# Patient Record
Sex: FEMALE | Race: WHITE | NOT HISPANIC OR LATINO | Employment: UNEMPLOYED | URBAN - METROPOLITAN AREA
[De-identification: names, ages, dates, MRNs, and addresses within clinical notes are randomized per-mention and may not be internally consistent; named-entity substitution may affect disease eponyms.]

---

## 2020-07-20 ENCOUNTER — OFFICE VISIT (OUTPATIENT)
Dept: NEUROSURGERY | Facility: CLINIC | Age: 55
End: 2020-07-20

## 2020-07-20 VITALS — BODY MASS INDEX: 38.05 KG/M2 | WEIGHT: 228.4 LBS | TEMPERATURE: 97.8 F | HEIGHT: 65 IN

## 2020-07-20 DIAGNOSIS — R55 SYNCOPE AND COLLAPSE: ICD-10-CM

## 2020-07-20 DIAGNOSIS — R51.9 BILATERAL HEADACHES: ICD-10-CM

## 2020-07-20 DIAGNOSIS — R00.1 BRADYCARDIA: ICD-10-CM

## 2020-07-20 DIAGNOSIS — R42 DIZZINESS OF UNKNOWN CAUSE: ICD-10-CM

## 2020-07-20 DIAGNOSIS — M50.30 DDD (DEGENERATIVE DISC DISEASE), CERVICAL: Primary | ICD-10-CM

## 2020-07-20 PROCEDURE — 99203 OFFICE O/P NEW LOW 30 MIN: CPT | Performed by: NEUROLOGICAL SURGERY

## 2020-07-20 RX ORDER — MELOXICAM 7.5 MG/1
7.5 TABLET ORAL DAILY
COMMUNITY

## 2020-07-20 RX ORDER — LISINOPRIL 10 MG/1
10 TABLET ORAL DAILY
COMMUNITY

## 2020-07-20 RX ORDER — ESOMEPRAZOLE MAGNESIUM 40 MG/1
40 CAPSULE, DELAYED RELEASE ORAL DAILY
COMMUNITY
Start: 2016-03-28

## 2020-07-20 RX ORDER — SERTRALINE HYDROCHLORIDE 25 MG/1
50 TABLET, FILM COATED ORAL DAILY
COMMUNITY

## 2020-07-20 NOTE — PROGRESS NOTES
Ciara Hector  1965  [id number]      Chief Complaint   Patient presents with   • Neck Pain   • Headache   • Dizziness   • Loss of Consciousness     2x       HISTORY OF PRESENT ILLNESS: This is a 55-year-old female who presents with 8-month history severe headaches associated with syncopal episodes, vertigo and memory loss.  Cervical MRI was performed and she is referred for neurosurgical consultation.  She has pain with range of motion of her neck and hears a crunching sound.  However she has no radiculopathy.  She has no symptoms of myelopathy.    Past Medical History:   Diagnosis Date   • Diabetes mellitus (CMS/HCC)     Type 2   • Sciatic pain        Past Surgical History:   Procedure Laterality Date   • ABDOMINAL HYSTERECTOMY     • APPENDECTOMY     • CHOLECYSTECTOMY     • DIAGNOSTIC LAPAROSCOPY      Lysis of Adhesions   • GASTRIC BYPASS     • LAPAROSCOPIC GASTRIC BANDING     • TUBAL ABDOMINAL LIGATION         Family History   Problem Relation Age of Onset   • Diabetes Mother    • Hypertension Mother    • Liver disease Mother    • No Known Problems Father        Social History     Socioeconomic History   • Marital status:      Spouse name: Not on file   • Number of children: Not on file   • Years of education: Not on file   • Highest education level: Not on file   Tobacco Use   • Smoking status: Current Every Day Smoker     Packs/day: 0.50     Years: 20.00     Pack years: 10.00     Types: Cigarettes   • Smokeless tobacco: Never Used   Substance and Sexual Activity   • Alcohol use: No   • Drug use: No   • Sexual activity: Defer       Allergies   Allergen Reactions   • Dilaudid [Hydromorphone Hcl]          Current Outpatient Medications:   •  aspirin 81 MG EC tablet, Take 81 mg by mouth Daily., Disp: , Rfl:   •  esomeprazole (nexIUM) 40 MG capsule, Take 40 mg by mouth Daily., Disp: , Rfl:   •  lisinopril (PRINIVIL,ZESTRIL) 10 MG tablet, Take 10 mg by mouth Daily., Disp: , Rfl:   •  meloxicam (MOBIC) 7.5  MG tablet, Take 7.5 mg by mouth Daily., Disp: , Rfl:   •  Multiple Vitamins-Minerals (MULTIVITAMIN ADULT PO), Take 1 tablet by mouth Daily., Disp: , Rfl:   •  sertraline (ZOLOFT) 25 MG tablet, Take 25 mg by mouth Daily., Disp: , Rfl:     Review of Systems   Constitutional: Positive for activity change, diaphoresis, fatigue and unexpected weight change. Negative for appetite change, chills and fever.   HENT: Positive for ear pain. Negative for congestion, dental problem, drooling, ear discharge, facial swelling, hearing loss, mouth sores, nosebleeds, postnasal drip, rhinorrhea, sinus pressure, sneezing, sore throat, tinnitus, trouble swallowing and voice change.    Eyes: Positive for photophobia. Negative for pain, discharge, redness, itching and visual disturbance.   Respiratory: Negative for apnea, cough, choking, chest tightness, shortness of breath, wheezing and stridor.    Cardiovascular: Negative for chest pain, palpitations and leg swelling.   Gastrointestinal: Positive for diarrhea and vomiting. Negative for abdominal distention, abdominal pain, anal bleeding, blood in stool, constipation, nausea and rectal pain.   Endocrine: Positive for heat intolerance and polyuria. Negative for cold intolerance, polydipsia and polyphagia.   Genitourinary: Positive for frequency. Negative for decreased urine volume, difficulty urinating, dysuria, enuresis, flank pain, genital sores, hematuria and urgency.   Musculoskeletal: Positive for arthralgias, back pain, joint swelling, neck pain and neck stiffness. Negative for gait problem and myalgias.   Skin: Positive for pallor. Negative for color change, rash and wound.   Allergic/Immunologic: Negative for environmental allergies, food allergies and immunocompromised state.   Neurological: Positive for dizziness, syncope, weakness, light-headedness and headaches. Negative for tremors, seizures, facial asymmetry, speech difficulty and numbness.   Hematological: Negative for  "adenopathy. Does not bruise/bleed easily.   Psychiatric/Behavioral: Positive for decreased concentration and sleep disturbance. Negative for agitation, behavioral problems, confusion, dysphoric mood, hallucinations, self-injury and suicidal ideas. The patient is not nervous/anxious and is not hyperactive.    All other systems reviewed and are negative.      Vitals:    07/20/20 1037   Temp: 97.8 °F (36.6 °C)   TempSrc: Infrared   Weight: 104 kg (228 lb 6.4 oz)   Height: 163.8 cm (64.5\")       Neurological Examination:    Mental status/speech: The patient is alert and oriented.  Speech is clear without aphysia or dysarthria.  No overt cognitive deficits.    Cranial nerve examination:    Olfaction: Smell is intact.  Vision: Vision is intact without visual field abnormalities.  Funduscopic examination is normal.  No pupillary irregularity.  Ocular motor examination: The extraocular muscles are intact.  There is no diplopia.  The pupil is round and reactive to both light and accommodation.  There is no nystagmus.  Facial movement/sensation: There is no facial weakness.  Sensation is intact in the first, second, and third divisions of the trigeminal nerve.  The corneal reflex is intact.  Auditory: Hearing is intact to finger rub bilaterally.  Cranial nerves IX, X, XI, XII: Phonation is normal.  No dysphagia.  Tongue is protruded in the midline without atrophy.  The gag reflex is intact.  Shoulder shrug is normal.    Musculoligamentous ligamentous examination: BMI 38.6.  Weight 228 pounds.  She has diminished range of motion of the cervical spine.  Strength is intact.  No weakness, sensory loss or reflex asymmetry.  Gait normal.  No ataxia.  No Babinski Cami or clonus.    Medical Decision Making:     Diagnostic Data Set: Cervical MRI shows mild disc degeneration C5-6 and C6-7.  No significant compromise of the spinal canal.      Assessment: Cervical spondylosis, syncope etiology undetermined          Recommendations: " She has mild cervical spondylosis which does not explain the symptoms of syncope.  I have recommended evaluation by cardiology and neurology.  I have made the appropriate referrals.  She does not have a condition that requires neurosurgical intervention.        I greatly appreciate the opportunity to see and evaluate this individual.  If you have questions or concerns regarding issues that I may have overlooked please call me at any time: [phone].  Mychal Caballero M.D.  [facility]  [address] [place] [address]    Scribed for Btuch Caballero MD by Maria Isabel Mathis CMA. 7/20/2020 11:23

## 2020-07-23 ENCOUNTER — OFFICE VISIT (OUTPATIENT)
Dept: NEUROLOGY | Facility: CLINIC | Age: 55
End: 2020-07-23

## 2020-07-23 VITALS
SYSTOLIC BLOOD PRESSURE: 108 MMHG | DIASTOLIC BLOOD PRESSURE: 70 MMHG | OXYGEN SATURATION: 97 % | WEIGHT: 229 LBS | HEIGHT: 64 IN | TEMPERATURE: 97.5 F | BODY MASS INDEX: 39.09 KG/M2 | HEART RATE: 66 BPM

## 2020-07-23 DIAGNOSIS — R55 SYNCOPE AND COLLAPSE: Primary | ICD-10-CM

## 2020-07-23 DIAGNOSIS — R42 DIZZINESS OF UNKNOWN CAUSE: ICD-10-CM

## 2020-07-23 PROCEDURE — 99205 OFFICE O/P NEW HI 60 MIN: CPT | Performed by: PSYCHIATRY & NEUROLOGY

## 2020-07-23 NOTE — PROGRESS NOTES
"Subjective:    CC: Ciara Hector is seen today in consultation at the request of Butch Caballero MD for Headache (NP)       HPI:  55-year-old female with a past medical history of hypertension, diabetes mellitus type 2 (well controlled off medications), arthritis presents with dizziness/syncopal episodes.  As per patient she started getting extremely dizzy in January this year.  She describes the dizziness as a feeling of the room spinning around her as well as a sense of constant imbalance while walking as if being \"drunk \".  The dizziness can be provoked by sudden head movement or by standing up but is present all the time now.  A few months ago she also started to have fatigue, weakness as well as frequent headaches that start off in the back of her head and radiate to the front like a band.  She states that her ears hurt too.  Had blood work that was unremarkable including a blood glucose level and imaging of the cervical spine which showed degenerative changes and was told by her PCP that her symptoms could be due to that however she saw Dr. Caabllero who did not think that she was a surgical candidate right now or that her symptoms were being caused by a pinched nerve.  Patient states that she has also had 2 syncopal episodes in the past 3 weeks.  During the first episode she was eating in a restaurant with a friend when all of a sudden she felt hot even though there was air conditioning inside followed by passing out for a few minutes.  When she woke up she was extremely sweaty.  Her second episode happened a week ago when she got up to get something and suddenly fell down losing consciousness.  As per her son she was cold and clammy and her lips turned blue.  He also could not find a pulse on her for a few seconds.  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  I reviewed her last cardiac work-up in 2015 including an echocardiogram and a carotid Doppler.  Carotid Doppler showed bilateral plaques with no significant stenosis and echocardiogram showed a normal EF with mild pulmonary hypertension but no PFO.  Patient also says that she was started on lisinopril in May of this year as few of her readings in clinic for high however her baseline blood pressure has always been low.    The following portions of the patient's history were reviewed today and updated as of 07/23/2020  : allergies, current medications, past family history, past medical history, past social history, past surgical history and problem list  These document will be scanned to patient's chart.      Current Outpatient Medications:   •  aspirin 81 MG EC tablet, Take 81 mg by mouth Daily., Disp: , Rfl:   •  esomeprazole (nexIUM) 40 MG capsule, Take 40 mg by mouth Daily., Disp: , Rfl:   •  lisinopril (PRINIVIL,ZESTRIL) 10 MG tablet, Take 10 mg by mouth Daily., Disp: , Rfl:   •  meloxicam (MOBIC) 7.5 MG tablet, Take 7.5 mg by mouth Daily., Disp: , Rfl:   •  Multiple Vitamins-Minerals (MULTIVITAMIN ADULT PO), Take 1 tablet by mouth Daily., Disp: , Rfl:   •  sertraline (ZOLOFT) 25 MG tablet, Take 25 mg by mouth Daily., Disp: , Rfl:    Past Medical History:   Diagnosis Date   • Diabetes mellitus (CMS/HCC)     Type 2   • Sciatic pain       Past Surgical History:   Procedure Laterality Date   • ABDOMINAL HYSTERECTOMY     • APPENDECTOMY     • CHOLECYSTECTOMY     • DIAGNOSTIC LAPAROSCOPY      Lysis of Adhesions   • GASTRIC BYPASS     • LAPAROSCOPIC GASTRIC BANDING     • TUBAL ABDOMINAL LIGATION        Family History   Problem Relation Age of Onset   • Diabetes Mother    • Hypertension Mother    • Liver disease Mother    • No Known Problems Father       Social History     Socioeconomic History   • Marital status:      Spouse name: Not on file   • Number of children: Not on file   • Years of  "education: Not on file   • Highest education level: Not on file   Tobacco Use   • Smoking status: Current Every Day Smoker     Packs/day: 0.50     Years: 20.00     Pack years: 10.00     Types: Cigarettes   • Smokeless tobacco: Never Used   Substance and Sexual Activity   • Alcohol use: No   • Drug use: No   • Sexual activity: Defer     Review of Systems   Constitutional: Positive for fatigue.   HENT: Positive for tinnitus.    Neurological: Positive for dizziness, syncope, weakness, light-headedness (feels like she is drunk at times, staggering etc.) and headache (Head hurts down the sides, including ear area).   All other systems reviewed and are negative.      Objective:    /70   Pulse 66   Temp 97.5 °F (36.4 °C)   Ht 163.8 cm (64.49\")   Wt 104 kg (229 lb)   SpO2 97%   BMI 38.72 kg/m²     Neurology Exam:    General apperance: NAD.  Orthostatics checked today minimally positive and showed a 18 point drop point drop in systolic blood pressure on standing up.  Blood pressure on laying down went up to 180/78 with a pulse of 58 and blood pressure on standing up was 162/79 with a heart rate of 59.  Modified Elvia-Hallpike test made her extremely dizzy however no nystagmus was noted    Mental status: Alert, awake and oriented to time place and person.    Recent and Remote memory: Intact.    Attention span and Concentration: Normal.     Language and Speech: Intact- No dysarthria.    Fluency, Naming , Repitition and Comprehension:  Intact    Cranial Nerves:   CN II: Visual fields are full. Intact. Fundi - Normal, No papillederma, Pupils - YOEL  CN III, IV and VI: Extraocular movements are intact. Normal saccades.  Mild end gaze nystagmus to the right  CN V: Facial sensation is intact.   CN VII: Muscles of facial expression reveal no asymmetry. Intact.   CN VIII: Hearing is intact. Whispered voice intact.   CN IX and X: Palate elevates symmetrically. Intact  CN XI: Shoulder shrug is intact.   CN XII: Tongue is " midline without evidence of atrophy or fasciculation.     Ophthalmoscopic exam of optic disc-normal    Motor:  Right UE muscle strength 5/5. Normal tone.     Left UE muscle strength 5/5. Normal tone.      Right LE muscle strength5/5. Normal tone.     Left LE muscle strength 5/5. Normal tone.      Sensory: Normal light touch, vibration and pinprick sensation bilaterally.    DTRs: 2+ bilaterally in upper and lower extremities.    Babinski: Negative bilaterally.    Co-ordination: Normal finger-to-nose, heel to shin B/L.    Rhomberg: Positive    Gait: Mildly ataxic while walking    Cardiovascular: Regular rate and rhythm without murmur, gallop or rub.    Assessment and Plan:  1. Syncope and collapse  I will get a Holter monitor.  Patient is also seeing a cardiologist in a few days  For orthostatic hypotension I have told her to follow the below measures.    - Holter Monitor - 72 Hour Up To 21 Days; Future  - MRI Brain Without Contrast; Future  - CT Angiogram Head; Future  - CT Angiogram Neck; Future    2. Dizziness of unknown cause  Based on her constant dizziness I would like to rule out a posterior circulation stroke.  Orthostatics were also minimally positive today  And so we will get a MRI brain in addition to a CT angiogram of the head and neck to rule out vertebral basilar/carotid insufficiency  I have told her to keep herself extremely well-hydrated and wear above-knee moderate compression stockings for increased venous return  She should continue aspirin 81 mg daily for now  I have told her to monitor her blood pressure every day    - MRI Brain Without Contrast; Future  - CT Angiogram Head; Future  - CT Angiogram Neck; Future       Return in about 6 weeks (around 9/3/2020).     I spent over 60 minutes with the patient face to face out of which over 50% (30 minutes) was spent in management, instructions and education regarding her episodes.     Lis Israel MD

## 2020-07-29 ENCOUNTER — HOSPITAL ENCOUNTER (OUTPATIENT)
Dept: CT IMAGING | Facility: HOSPITAL | Age: 55
Discharge: HOME | End: 2020-07-29

## 2020-07-29 ENCOUNTER — HOSPITAL ENCOUNTER (OUTPATIENT)
Dept: MRI IMAGING | Facility: HOSPITAL | Age: 55
Discharge: HOME | End: 2020-07-29
Admitting: PSYCHIATRY & NEUROLOGY

## 2020-07-29 ENCOUNTER — CONSULT (OUTPATIENT)
Dept: CARDIOLOGY | Facility: CLINIC | Age: 55
End: 2020-07-29

## 2020-07-29 VITALS
HEART RATE: 56 BPM | HEIGHT: 64 IN | WEIGHT: 226 LBS | OXYGEN SATURATION: 96 % | DIASTOLIC BLOOD PRESSURE: 72 MMHG | SYSTOLIC BLOOD PRESSURE: 120 MMHG | BODY MASS INDEX: 38.58 KG/M2

## 2020-07-29 DIAGNOSIS — I10 ESSENTIAL HYPERTENSION: ICD-10-CM

## 2020-07-29 DIAGNOSIS — R42 DIZZINESS OF UNKNOWN CAUSE: ICD-10-CM

## 2020-07-29 DIAGNOSIS — R00.1 BRADYCARDIA: ICD-10-CM

## 2020-07-29 DIAGNOSIS — R55 SYNCOPE AND COLLAPSE: ICD-10-CM

## 2020-07-29 DIAGNOSIS — R55 SYNCOPE AND COLLAPSE: Primary | ICD-10-CM

## 2020-07-29 DIAGNOSIS — I20.8 ANGINAL EQUIVALENT (HCC): ICD-10-CM

## 2020-07-29 PROBLEM — G45.9 TIA (TRANSIENT ISCHEMIC ATTACK): Status: ACTIVE | Noted: 2020-07-29

## 2020-07-29 PROBLEM — I77.9 BILATERAL CAROTID ARTERY DISEASE (HCC): Status: ACTIVE | Noted: 2020-07-29

## 2020-07-29 PROBLEM — Z86.73 HISTORY OF TRANSIENT ISCHEMIC ATTACK (TIA): Status: ACTIVE | Noted: 2020-07-29

## 2020-07-29 PROBLEM — E11.9 DM (DIABETES MELLITUS), TYPE 2 (HCC): Status: ACTIVE | Noted: 2020-07-29

## 2020-07-29 PROBLEM — Z72.0 TOBACCO ABUSE: Status: ACTIVE | Noted: 2020-07-29

## 2020-07-29 PROBLEM — K21.9 GERD (GASTROESOPHAGEAL REFLUX DISEASE): Status: ACTIVE | Noted: 2020-07-29

## 2020-07-29 LAB — CREAT BLDA-MCNC: 0.8 MG/DL (ref 0.6–1.3)

## 2020-07-29 PROCEDURE — 70496 CT ANGIOGRAPHY HEAD: CPT

## 2020-07-29 PROCEDURE — 99204 OFFICE O/P NEW MOD 45 MIN: CPT | Performed by: INTERNAL MEDICINE

## 2020-07-29 PROCEDURE — 70551 MRI BRAIN STEM W/O DYE: CPT

## 2020-07-29 PROCEDURE — 70498 CT ANGIOGRAPHY NECK: CPT

## 2020-07-29 PROCEDURE — 0 IOPAMIDOL PER 1 ML: Performed by: PSYCHIATRY & NEUROLOGY

## 2020-07-29 PROCEDURE — 82565 ASSAY OF CREATININE: CPT

## 2020-07-29 RX ADMIN — IOPAMIDOL 75 ML: 755 INJECTION, SOLUTION INTRAVENOUS at 15:53

## 2020-07-29 NOTE — PROGRESS NOTES
"      Subjective   Ciara Hector is a 55 y.o. female.  Primary Care: Yenny Aldrich APRN  Referring: Butch Caballero MD  [address] [facility]  [address]  [place] [address]      Chief Complaint   Patient presents with   • Dizziness   • Irregular Heart Beat   • Leg Pain   • Nausea   • Vomiting     not often        Patient Active Problem List    Diagnosis    1 Dizziness of unknown cause    2 Bradycardia    3 Syncope and collapse      · Echo 3-27-15: Normal LV, EF 60%.  Mild pulmonary hypertension, RVSP 35 mmHg.  Negative bubble study   4 Essential hypertension    5 Bilateral carotid artery disease (CMS/HCC)      Duplex 3-26-15: mild plague bilaterally   6 Tobacco abuse    7 DM (diabetes mellitus), type 2 (CMS/HCC)    8 Abnormal EKG    9 GERD (gastroesophageal reflux disease)    10 History of transient ischemic attack (TIA)    11 DDD (degenerative disc disease), cervical    12 Bilateral headaches       History of Present Illness   This is a 55-year-old hypertensive diabetic female smoker with no significant prior cardiac history.  She underwent cardiac evaluation for presurgical clearance in 2016 at which time she was felt to have an abnormal EKG.  She had had a normal echocardiogram the previous year and was cleared for surgery.  In 2015 she had a TIA which consisted of 2 brief episodes of right-sided facial drooping and numbness.  She had a carotid duplex study at that time which showed mild nonobstructive carotid plaques bilaterally.  She now presents with a 7-month complaint of progressive dizziness nausea and headache.  She states her ears are \"constantly roaring\".  She has had 3 episodes of sayda syncope in the past 3 weeks.  States she gets no warning and wakes on the floor unaware of the events.  There is no prodrome of dizziness, lightheadedness or tachypalpitations.  The second syncopal episode was witnessed by family who states there was no tonic-clonic activity and that she was out for " approximately 1 minute.  She awakens oriented to person place and time.  She had loss of bladder control on her third syncopal episode.  She states she always wakes up feeling hot.  Her blood pressure has been running greater than 160 mmHg systolic.  She has been on her current antihypertensive since early this spring.  She is a diet-controlled diabetic.  States she has never been on statin therapy.  She has a near lifelong awareness of occasional palpitations which are brief and self-limiting.  She denies exertional chest pain or dyspnea, denies orthopnea or PND.  She denies lower extremity edema or claudication.  She drinks approximately 5 cups of coffee per day and consumes 4 to 5 16 ounce servings of water daily.  She is already been evaluated by neurology.  During that visit she was mildly orthostatic.  She had an MRI earlier this morning in [place].  She has had an MRI of the brain here showing no evidence of infarction.  CTA of the neck remains pending.    Past Surgical History:   Procedure Laterality Date   • ABDOMINAL HYSTERECTOMY     • APPENDECTOMY     • CHOLECYSTECTOMY     • DIAGNOSTIC LAPAROSCOPY      Lysis of Adhesions   • GASTRIC BYPASS     • LAPAROSCOPIC GASTRIC BANDING     • TUBAL ABDOMINAL LIGATION         The following portions of the patient's history were reviewed and updated as appropriate: allergies, current medications, past family history, past medical history, past social history, past surgical history and problem list.    Allergies   Allergen Reactions   • Dilaudid [Hydromorphone Hcl] Anaphylaxis   • Tape Other (See Comments)     Plastic Tape causes Blisters         Current Outpatient Medications:   •  aspirin 81 MG EC tablet, Take 81 mg by mouth Daily., Disp: , Rfl:   •  esomeprazole (nexIUM) 40 MG capsule, Take 40 mg by mouth Daily., Disp: , Rfl:   •  IBUPROFEN PO, Take  by mouth As Needed., Disp: , Rfl:   •  lisinopril (PRINIVIL,ZESTRIL) 10 MG tablet, Take 10 mg by mouth Daily., Disp: ,  "Rfl:   •  meloxicam (MOBIC) 7.5 MG tablet, Take 7.5 mg by mouth Daily., Disp: , Rfl:   •  Multiple Vitamins-Minerals (MULTIVITAMIN ADULT PO), Take 1 tablet by mouth Daily., Disp: , Rfl:   •  sertraline (ZOLOFT) 25 MG tablet, Take 50 mg by mouth Daily., Disp: , Rfl:     Review of Systems   Constitution: Positive for malaise/fatigue.   HENT: Positive for tinnitus.    Eyes: Positive for blurred vision and visual halos.   Cardiovascular: Positive for leg swelling, palpitations and syncope.   Respiratory: Positive for sputum production.    Endocrine: Positive for heat intolerance and polyuria.   Hematologic/Lymphatic: Negative.    Skin: Negative.    Musculoskeletal: Positive for arthritis and muscle weakness.   Gastrointestinal: Positive for change in bowel habit, heartburn, nausea and vomiting.   Genitourinary: Positive for bladder incontinence and urgency.   Neurological: Positive for excessive daytime sleepiness, dizziness, headaches and vertigo.   Psychiatric/Behavioral: Positive for memory loss.   Allergic/Immunologic: Negative.    All other systems reviewed and are negative.      Social History     Socioeconomic History   • Marital status:      Spouse name: Not on file   • Number of children: Not on file   • Years of education: Not on file   • Highest education level: Not on file   Tobacco Use   • Smoking status: Current Every Day Smoker     Packs/day: 0.50     Years: 20.00     Pack years: 10.00     Types: Cigarettes   • Smokeless tobacco: Never Used   Substance and Sexual Activity   • Alcohol use: Yes     Comment: occ.   • Drug use: No   • Sexual activity: Defer       Family History   Problem Relation Age of Onset   • Diabetes Mother    • Hypertension Mother    • Liver disease Mother    • No Known Problems Father        Objective      /72 (BP Location: Right arm, Patient Position: Sitting)   Pulse 56   Ht 162.6 cm (64\")   Wt 103 kg (226 lb)   SpO2 96%   BMI 38.79 kg/m²   Re check by Dr. Bhatti " 98/60  Physical Exam   Constitutional: She is oriented to person, place, and time. She appears well-developed and well-nourished. No distress.   HENT:   Head: Normocephalic and atraumatic.   Mouth/Throat: Oropharynx is clear and moist.   Eyes: Pupils are equal, round, and reactive to light. No scleral icterus.   Neck: Neck supple. No JVD present. No tracheal deviation present. No thyromegaly present.   Cardiovascular: Normal rate, regular rhythm and normal heart sounds. Exam reveals no gallop and no friction rub.   No murmur heard.  Pulmonary/Chest: Effort normal and breath sounds normal. No respiratory distress. She has no wheezes. She has no rales.   Abdominal: Soft. Bowel sounds are normal. She exhibits no distension and no mass. There is no tenderness. There is no rebound and no guarding.   Musculoskeletal: Normal range of motion. She exhibits no edema or deformity.   Lymphadenopathy:     She has no cervical adenopathy.   Neurological: She is alert and oriented to person, place, and time. No cranial nerve deficit.   Skin: Skin is warm and dry. No rash noted. She is not diaphoretic.   Psychiatric: She has a normal mood and affect.   Nursing note and vitals reviewed.        ECG 12 Lead  Date/Time: 7/29/2020 11:42 AM  Performed by: Natalia Bhatti MD  Authorized by: Natalia Bhatti MD   Previous ECG: no previous ECG available  Rhythm: sinus bradycardia  Rate: normal  BPM: 56  Conduction: conduction normal  ST Segments: ST segments normal  T Waves: T waves normal  T inversion: III  QRS axis: left  Other: no other findings    Clinical impression: abnormal EKG            Lab Review:   From primary care dated 4-30-20  BMP: BUN 16, cr 0.7, , K 4.5, Cl  103, CO2 20,   FLP: T chol 193, Trig 159, H 59,   SED Rate: 25  TSH: 3.32  T4: 1.32  CBC: WBC 6, Hgb 14.4, HCT 42.6,         Assessment:   Diagnosis Plan   1. Syncope and collapse  ECG 12 Lead    Mobile Cardiac Outpatient Telemetry    Adult Transthoracic  Echo Complete W/ Cont if Necessary Per Protocol   2. Anginal equivalent (CMS/HCC)  Stress Test With Pet Myocardial Perfusion (MULTI STUDY, REST AND STRESS)   3. Essential hypertension   monitor blood pressure daily, if less than 110 do not take lisinopril   4. Bradycardia  Mobile Cardiac Outpatient Telemetry   5. Dizziness of unknown cause  Mobile Cardiac Outpatient Telemetry   Adult Transthoracic Echo Complete W/ Cont if Necessary Per Protocol   Stress Test With Pet Myocardial Perfusion (MULTI STUDY, REST AND STRESS)         Plan:  The patient has suffered from 3 episodes of sayda syncope, all of these episodes were while she was in upright position, she did not have ample warning except during the first episode she had some visual changes.  There was no associated chest pain dyspnea or palpitations although she has experienced palpitations at various times.  1 of the episodes was witnessed by her family and they were able to catch her before she fell to the floor.  Her neurologic work-up so far is negative.  At this time we have recommended an echocardiogram and outpatient cardiac telemetry for further assessment.  She is also experienced chest discomfort and exertional dyspnea as well as significant fatigue and we will obtain a myocardial perfusion stress test for further assessment of her angina equivalent symptoms.  Continue current medications.   Follow-up in 6 weeks, sooner as needed.  Thank you for allowing us to participate in the care of your patient.     Scribed for Natalia Bhatti MD by Electronically signed by Electronically signed by BEN Neville, 07/29/20, 2:10 PM.    I, Natalia Bhatti MD, personally performed the services described in this documentation as scribed by the above named individual in my presence, and it is both accurate and complete.  7/29/2020  16:20

## 2020-08-12 ENCOUNTER — DOCUMENTATION (OUTPATIENT)
Dept: CARDIOLOGY | Facility: CLINIC | Age: 55
End: 2020-08-12

## 2020-08-12 NOTE — PROGRESS NOTES
When I was on call, I was contacted by Ade 8/10/2020 regarding the patient having a syncopal episode.  When the patient had her syncopal episode she was in sinus rhythm with heart rate 64 bpm.  They called and spoke with the patient and she felt that she had loss of consciousness for 15-20 minutes.  She did not sustain any injuries and declined medical attention at that time.  I informed Dr. Bhatti 8/11/20.    Electronically signed by JHONY Castaneda, 08/12/20, 8:54 AM.

## 2020-08-17 ENCOUNTER — DOCUMENTATION (OUTPATIENT)
Dept: CARDIOLOGY | Facility: CLINIC | Age: 55
End: 2020-08-17

## 2020-09-02 ENCOUNTER — TELEPHONE (OUTPATIENT)
Dept: CARDIOLOGY | Facility: CLINIC | Age: 55
End: 2020-09-02

## 2020-09-03 ENCOUNTER — HOSPITAL ENCOUNTER (OUTPATIENT)
Dept: CARDIOLOGY | Facility: HOSPITAL | Age: 55
Discharge: HOME | End: 2020-09-03
Admitting: PHYSICIAN ASSISTANT

## 2020-09-03 ENCOUNTER — HOSPITAL ENCOUNTER (OUTPATIENT)
Dept: CARDIOLOGY | Facility: HOSPITAL | Age: 55
Discharge: HOME | End: 2020-09-03

## 2020-09-03 ENCOUNTER — APPOINTMENT (OUTPATIENT)
Dept: CARDIOLOGY | Facility: HOSPITAL | Age: 55
End: 2020-09-03

## 2020-09-03 ENCOUNTER — OFFICE VISIT (OUTPATIENT)
Dept: NEUROLOGY | Facility: CLINIC | Age: 55
End: 2020-09-03

## 2020-09-03 ENCOUNTER — APPOINTMENT (OUTPATIENT)
Dept: PREADMISSION TESTING | Facility: HOSPITAL | Age: 55
End: 2020-09-03

## 2020-09-03 VITALS
WEIGHT: 210 LBS | SYSTOLIC BLOOD PRESSURE: 100 MMHG | HEIGHT: 64 IN | DIASTOLIC BLOOD PRESSURE: 50 MMHG | BODY MASS INDEX: 35.85 KG/M2 | HEART RATE: 50 BPM

## 2020-09-03 VITALS
TEMPERATURE: 97.1 F | HEART RATE: 78 BPM | DIASTOLIC BLOOD PRESSURE: 81 MMHG | SYSTOLIC BLOOD PRESSURE: 120 MMHG | WEIGHT: 229.6 LBS | BODY MASS INDEX: 39.2 KG/M2 | OXYGEN SATURATION: 98 % | HEIGHT: 64 IN

## 2020-09-03 VITALS — WEIGHT: 210 LBS | BODY MASS INDEX: 35.85 KG/M2 | HEIGHT: 64 IN

## 2020-09-03 DIAGNOSIS — G44.209 TENSION HEADACHE: ICD-10-CM

## 2020-09-03 DIAGNOSIS — R55 SYNCOPE AND COLLAPSE: Primary | ICD-10-CM

## 2020-09-03 DIAGNOSIS — I65.23 BILATERAL CAROTID ARTERY STENOSIS: ICD-10-CM

## 2020-09-03 DIAGNOSIS — F17.200 SMOKER: ICD-10-CM

## 2020-09-03 LAB
BH CV ECHO MEAS - AO ROOT AREA (BSA CORRECTED): 1.3
BH CV ECHO MEAS - AO ROOT AREA: 5.4 CM^2
BH CV ECHO MEAS - AO ROOT DIAM: 2.6 CM
BH CV ECHO MEAS - BSA(HAYCOCK): 2.1 M^2
BH CV ECHO MEAS - BSA: 2 M^2
BH CV ECHO MEAS - BZI_BMI: 36 KILOGRAMS/M^2
BH CV ECHO MEAS - BZI_METRIC_HEIGHT: 162.6 CM
BH CV ECHO MEAS - BZI_METRIC_WEIGHT: 95.3 KG
BH CV ECHO MEAS - EDV(CUBED): 83.3 ML
BH CV ECHO MEAS - EDV(MOD-SP2): 44.1 ML
BH CV ECHO MEAS - EDV(MOD-SP4): 55.1 ML
BH CV ECHO MEAS - EDV(TEICH): 86.2 ML
BH CV ECHO MEAS - EF(CUBED): 38.9 %
BH CV ECHO MEAS - EF(MOD-BP): 65.8 %
BH CV ECHO MEAS - EF(MOD-SP2): 64.2 %
BH CV ECHO MEAS - EF(MOD-SP4): 67.3 %
BH CV ECHO MEAS - EF(TEICH): 32.3 %
BH CV ECHO MEAS - ESV(CUBED): 50.9 ML
BH CV ECHO MEAS - ESV(MOD-SP2): 15.8 ML
BH CV ECHO MEAS - ESV(MOD-SP4): 18 ML
BH CV ECHO MEAS - ESV(TEICH): 58.4 ML
BH CV ECHO MEAS - FS: 15.1 %
BH CV ECHO MEAS - IVS/LVPW: 0.61
BH CV ECHO MEAS - IVSD: 0.84 CM
BH CV ECHO MEAS - LA DIMENSION: 4.3 CM
BH CV ECHO MEAS - LA/AO: 1.7
BH CV ECHO MEAS - LAD MAJOR: 5.4 CM
BH CV ECHO MEAS - LAT PEAK E' VEL: 9.1 CM/SEC
BH CV ECHO MEAS - LATERAL E/E' RATIO: 9.3
BH CV ECHO MEAS - LV DIASTOLIC VOL/BSA (35-75): 27.6 ML/M^2
BH CV ECHO MEAS - LV MASS(C)D: 169.3 GRAMS
BH CV ECHO MEAS - LV MASS(C)DI: 84.8 GRAMS/M^2
BH CV ECHO MEAS - LV SYSTOLIC VOL/BSA (12-30): 9 ML/M^2
BH CV ECHO MEAS - LVIDD: 4.4 CM
BH CV ECHO MEAS - LVIDS: 3.7 CM
BH CV ECHO MEAS - LVLD AP2: 6.3 CM
BH CV ECHO MEAS - LVLD AP4: 6.9 CM
BH CV ECHO MEAS - LVLS AP2: 4.8 CM
BH CV ECHO MEAS - LVLS AP4: 5.3 CM
BH CV ECHO MEAS - LVPWD: 1.4 CM
BH CV ECHO MEAS - MED PEAK E' VEL: 6.9 CM/SEC
BH CV ECHO MEAS - MEDIAL E/E' RATIO: 12.5
BH CV ECHO MEAS - MV A MAX VEL: 102.8 CM/SEC
BH CV ECHO MEAS - MV DEC SLOPE: 349.8 CM/SEC^2
BH CV ECHO MEAS - MV DEC TIME: 0.25 SEC
BH CV ECHO MEAS - MV E MAX VEL: 85.3 CM/SEC
BH CV ECHO MEAS - MV E/A: 0.83
BH CV ECHO MEAS - MV P1/2T MAX VEL: 131.7 CM/SEC
BH CV ECHO MEAS - MV P1/2T: 110.2 MSEC
BH CV ECHO MEAS - MVA P1/2T LCG: 1.7 CM^2
BH CV ECHO MEAS - MVA(P1/2T): 2 CM^2
BH CV ECHO MEAS - RAP SYSTOLE: 3 MMHG
BH CV ECHO MEAS - RVSP: 22 MMHG
BH CV ECHO MEAS - SI(CUBED): 16.2 ML/M^2
BH CV ECHO MEAS - SI(MOD-SP2): 14.2 ML/M^2
BH CV ECHO MEAS - SI(MOD-SP4): 18.6 ML/M^2
BH CV ECHO MEAS - SI(TEICH): 13.9 ML/M^2
BH CV ECHO MEAS - SV(CUBED): 32.4 ML
BH CV ECHO MEAS - SV(MOD-SP2): 28.3 ML
BH CV ECHO MEAS - SV(MOD-SP4): 37.1 ML
BH CV ECHO MEAS - SV(TEICH): 27.8 ML
BH CV ECHO MEAS - TAPSE (>1.6): 2.8 CM
BH CV ECHO MEAS - TR MAX PG: 19 MMHG
BH CV ECHO MEAS - TR MAX VEL: 220.3 CM/SEC
BH CV ECHO MEASUREMENTS AVERAGE E/E' RATIO: 10.66
BH CV STRESS BP STAGE 1: NORMAL
BH CV STRESS BP STAGE 3: NORMAL
BH CV STRESS COMMENTS STAGE 1: NORMAL
BH CV STRESS DOSE REGADENOSON STAGE 1: 0.4
BH CV STRESS DURATION MIN STAGE 1: 1
BH CV STRESS DURATION MIN STAGE 2: 1
BH CV STRESS DURATION MIN STAGE 3: 1
BH CV STRESS DURATION MIN STAGE 4: 1
BH CV STRESS DURATION SEC STAGE 1: 0
BH CV STRESS DURATION SEC STAGE 2: 0
BH CV STRESS DURATION SEC STAGE 3: 0
BH CV STRESS DURATION SEC STAGE 4: 0
BH CV STRESS HR STAGE 1: 73
BH CV STRESS HR STAGE 2: 75
BH CV STRESS HR STAGE 3: 72
BH CV STRESS HR STAGE 4: 71
BH CV STRESS O2 STAGE 1: 98
BH CV STRESS O2 STAGE 2: 99
BH CV STRESS O2 STAGE 3: 99
BH CV STRESS O2 STAGE 4: 98
BH CV STRESS PROTOCOL 1: NORMAL
BH CV STRESS RECOVERY BP: NORMAL MMHG
BH CV STRESS RECOVERY HR: 63 BPM
BH CV STRESS RECOVERY O2: 97 %
BH CV STRESS STAGE 1: 1
BH CV STRESS STAGE 2: 2
BH CV STRESS STAGE 3: 3
BH CV STRESS STAGE 4: 4
BH CV VAS BP RIGHT ARM: NORMAL MMHG
BH CV XLRA - RV BASE: 3.5 CM
BH CV XLRA - RV LENGTH: 7.7 CM
BH CV XLRA - RV MID: 3.2 CM
BH CV XLRA - TDI S': 9.7 CM/SEC
LEFT ATRIUM VOLUME INDEX: 23.2 ML/M^2
LEFT ATRIUM VOLUME: 46.3 ML
LV EF 2D ECHO EST: 60 %
LV EF NUC BP: 75 %
MAXIMAL PREDICTED HEART RATE: 165 BPM
MAXIMAL PREDICTED HEART RATE: 165 BPM
PERCENT MAX PREDICTED HR: 46.67 %
SARS-COV-2 RNA RESP QL NAA+PROBE: NOT DETECTED
STRESS BASELINE BP: NORMAL MMHG
STRESS BASELINE HR: 56 BPM
STRESS O2 SAT REST: 96 %
STRESS PERCENT HR: 55 %
STRESS POST ESTIMATED WORKLOAD: 1 METS
STRESS POST EXERCISE DUR MIN: 4 MIN
STRESS POST EXERCISE DUR SEC: 0 SEC
STRESS POST PEAK BP: NORMAL MMHG
STRESS POST PEAK HR: 77 BPM
STRESS TARGET HR: 140 BPM
STRESS TARGET HR: 140 BPM

## 2020-09-03 PROCEDURE — A9555 RB82 RUBIDIUM: HCPCS | Performed by: PHYSICIAN ASSISTANT

## 2020-09-03 PROCEDURE — 78492 MYOCRD IMG PET MLT RST&STRS: CPT | Performed by: INTERNAL MEDICINE

## 2020-09-03 PROCEDURE — 99406 BEHAV CHNG SMOKING 3-10 MIN: CPT | Performed by: PSYCHIATRY & NEUROLOGY

## 2020-09-03 PROCEDURE — 93306 TTE W/DOPPLER COMPLETE: CPT | Performed by: INTERNAL MEDICINE

## 2020-09-03 PROCEDURE — 78492 MYOCRD IMG PET MLT RST&STRS: CPT

## 2020-09-03 PROCEDURE — C9803 HOPD COVID-19 SPEC COLLECT: HCPCS

## 2020-09-03 PROCEDURE — 93017 CV STRESS TEST TRACING ONLY: CPT

## 2020-09-03 PROCEDURE — 25010000002 REGADENOSON 0.4 MG/5ML SOLUTION: Performed by: PHYSICIAN ASSISTANT

## 2020-09-03 PROCEDURE — 0 RUBIDIUM CHLORIDE: Performed by: PHYSICIAN ASSISTANT

## 2020-09-03 PROCEDURE — 93306 TTE W/DOPPLER COMPLETE: CPT

## 2020-09-03 PROCEDURE — 93018 CV STRESS TEST I&R ONLY: CPT | Performed by: INTERNAL MEDICINE

## 2020-09-03 PROCEDURE — 99214 OFFICE O/P EST MOD 30 MIN: CPT | Performed by: PSYCHIATRY & NEUROLOGY

## 2020-09-03 PROCEDURE — 87635 SARS-COV-2 COVID-19 AMP PRB: CPT | Performed by: INTERNAL MEDICINE

## 2020-09-03 RX ORDER — ATORVASTATIN CALCIUM 20 MG/1
20 TABLET, FILM COATED ORAL DAILY
Qty: 30 TABLET | Refills: 11 | Status: SHIPPED | OUTPATIENT
Start: 2020-09-03 | End: 2021-09-13

## 2020-09-03 RX ORDER — PROPRANOLOL HCL 60 MG
60 CAPSULE, EXTENDED RELEASE 24HR ORAL DAILY
Qty: 30 CAPSULE | Refills: 5 | Status: SHIPPED | OUTPATIENT
Start: 2020-09-03 | End: 2020-11-05

## 2020-09-03 RX ADMIN — RUBIDIUM CHLORIDE RB-82 1 DOSE: 150 INJECTION, SOLUTION INTRAVENOUS at 10:19

## 2020-09-03 RX ADMIN — RUBIDIUM CHLORIDE RB-82 1 DOSE: 150 INJECTION, SOLUTION INTRAVENOUS at 10:32

## 2020-09-03 RX ADMIN — REGADENOSON 0.4 MG: 0.08 INJECTION, SOLUTION INTRAVENOUS at 10:38

## 2020-09-03 NOTE — PROGRESS NOTES
"Subjective:    CC: Ciara Hector is seen today for syncope, dizziness and headaches.    HPI:  Current visit-since the last visit patient continues to have dizziness where she feels off balance and lightheadedness on standing up and walking.  She has also had 3 more syncopal episodes where she felt hot/clammy and passed out.  During 1 of these episodes she was laying down and during to others she was sitting down.  She was wearing her event monitor during the spells and it either showed sinus rhythm or sinus bradycardia/tachycardia.  There was also paroxysmal SVT but no atrial fibrillation.  Had a stress test that was low risk and an echocardiogram the results of which are still pending.  She also had a MRI brain that I personally reviewed today that showed minimal chronic ischemic changes but no acute intracranial abnormalities as well as no old infarcts.  CT angiogram of the head and neck showed minimal about 40% left and 25% right carotid stenosis.  She is currently on aspirin 81 mg.  Her last lipid panel in May was as follows-, , , HDL 59.  Patient smokes about 5 cigarettes a day.  Today patient is also complaining of near daily occipital headaches that go around like a band.  She denies having any photophobia or phonophobia with the headaches.  Also denies snoring much at night.  Her blood pressure at home is usually in the 160s/90s.    Initial [id number] female with a past medical history of hypertension, diabetes mellitus type 2 (well controlled off medications), arthritis presents with dizziness/syncopal episodes.  As per patient she started getting extremely dizzy in January this year.  She describes the dizziness as a feeling of the room spinning around her as well as a sense of constant imbalance while walking as if being \"drunk \".  The dizziness can be provoked by sudden head movement or by standing up but is present all the time now.  A few months ago she also started to " have fatigue, weakness as well as frequent headaches that start off in the back of her head and radiate to the front like a band.  She states that her ears hurt too.  Had blood work that was unremarkable including a blood glucose level and imaging of the cervical spine which showed degenerative changes and was told by her PCP that her symptoms could be due to that however she saw Dr. Caballero who did not think that she was a surgical candidate right now or that her symptoms were being caused by a pinched nerve.  Patient states that she has also had 2 syncopal episodes in the past 3 weeks.  During the first episode she was eating in a restaurant with a friend when all of a sudden she felt hot even though there was air conditioning inside followed by passing out for a few minutes.  When she woke up she was extremely sweaty.  Her second episode happened a week ago when she got up to get something and suddenly fell down losing consciousness.  As per her son she was cold and clammy and her lips turned blue.  He also could not find a pulse on her for a few seconds.  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  I reviewed her last cardiac work-up in 2015 including an echocardiogram and a carotid Doppler.  Carotid Doppler showed bilateral plaques with no significant stenosis and echocardiogram showed a normal EF with mild pulmonary hypertension but no PFO.  Patient also says that she was started on lisinopril in May of this year as few of her readings in clinic for high however her baseline blood pressure has always been low.    The following portions of the patient's history were reviewed today and updated as of 07/23/2020  : allergies, current medications, past family history, past medical history, past social history, past surgical history and problem  list  These document will be scanned to patient's chart.      Current Outpatient Medications:   •  aspirin 81 MG EC tablet, Take 81 mg by mouth Daily., Disp: , Rfl:   •  esomeprazole (nexIUM) 40 MG capsule, Take 40 mg by mouth Daily., Disp: , Rfl:   •  lisinopril (PRINIVIL,ZESTRIL) 10 MG tablet, Take 10 mg by mouth Daily., Disp: , Rfl:   •  meloxicam (MOBIC) 7.5 MG tablet, Take 7.5 mg by mouth Daily., Disp: , Rfl:   •  Multiple Vitamins-Minerals (MULTIVITAMIN ADULT PO), Take 1 tablet by mouth Daily., Disp: , Rfl:   •  sertraline (ZOLOFT) 25 MG tablet, Take 50 mg by mouth Daily., Disp: , Rfl:   •  atorvastatin (Lipitor) 20 MG tablet, Take 1 tablet by mouth Daily., Disp: 30 tablet, Rfl: 11  •  IBUPROFEN PO, Take  by mouth As Needed., Disp: , Rfl:   •  propranolol LA (Inderal LA) 60 MG 24 hr capsule, Take 1 capsule by mouth Daily., Disp: 30 capsule, Rfl: 5  No current facility-administered medications for this visit.    Past Medical History:   Diagnosis Date   • Cancer (CMS/HCC)     uterus   • Diabetes mellitus (CMS/HCC)     Type 2   • Sciatic pain       Past Surgical History:   Procedure Laterality Date   • ABDOMINAL HYSTERECTOMY     • APPENDECTOMY     • CHOLECYSTECTOMY     • DIAGNOSTIC LAPAROSCOPY      Lysis of Adhesions   • GASTRIC BYPASS     • LAPAROSCOPIC GASTRIC BANDING     • TUBAL ABDOMINAL LIGATION        Family History   Problem Relation Age of Onset   • Diabetes Mother    • Hypertension Mother    • Liver disease Mother    • No Known Problems Father       Social History     Socioeconomic History   • Marital status:      Spouse name: Not on file   • Number of children: Not on file   • Years of education: Not on file   • Highest education level: Not on file   Tobacco Use   • Smoking status: Current Every Day Smoker     Packs/day: 0.50     Years: 20.00     Pack years: 10.00     Types: Cigarettes   • Smokeless tobacco: Never Used   Substance and Sexual Activity   • Alcohol use: Yes     Comment: occ.   •  "Drug use: No   • Sexual activity: Defer     Review of Systems   Constitutional: Positive for fatigue.   Neurological: Positive for dizziness, syncope, light-headedness (feels like she is drunk at times, staggering etc.) and headache (Head hurts down the sides, including ear area).   All other systems reviewed and are negative.      Objective:    /81   Pulse 78   Temp 97.1 °F (36.2 °C) (Temporal)   Ht 162.3 cm (63.9\")   Wt 104 kg (229 lb 9.6 oz)   SpO2 98%   BMI 39.54 kg/m²     Neurology Exam:    General apperance: NAD.  Orthostatics at last visit showed a 18 point drop point drop in systolic blood pressure on standing up.  Blood pressure on laying down went up to 180/78 with a pulse of 58 and blood pressure on standing up was 162/79 with a heart rate of 59.     Mental status: Alert, awake and oriented to time place and person.    Recent and Remote memory: Intact.    Attention span and Concentration: Normal.     Language and Speech: Intact- No dysarthria.    Fluency, Naming , Repitition and Comprehension:  Intact    Cranial Nerves:   CN II: Visual fields are full. Intact. Fundi - Normal, No papillederma, Pupils - YOEL  CN III, IV and VI: Extraocular movements are intact. Normal saccades.  Mild end gaze nystagmus to the right  CN V: Facial sensation is intact.   CN VII: Muscles of facial expression reveal no asymmetry. Intact.   CN VIII: Hearing is intact. Whispered voice intact.   CN IX and X: Palate elevates symmetrically. Intact  CN XI: Shoulder shrug is intact.   CN XII: Tongue is midline without evidence of atrophy or fasciculation.     Ophthalmoscopic exam of optic disc-normal    Motor:  Right UE muscle strength 5/5. Normal tone.     Left UE muscle strength 5/5. Normal tone.      Right LE muscle strength5/5. Normal tone.     Left LE muscle strength 5/5. Normal tone.      Sensory: Normal light touch, vibration and pinprick sensation bilaterally.    DTRs: 2+ bilaterally in upper and lower " extremities.    Babinski: Negative bilaterally.    Co-ordination: Normal finger-to-nose, heel to shin B/L.    Rhomberg: Positive    Gait: Mildly ataxic while walking    Cardiovascular: Regular rate and rhythm without murmur, gallop or rub.    Assessment and Plan:  1. Syncope and collapse  Could be vasovagal in nature.  Event monitor showed 1 run of PSVT but nothing significant with her passing out spells.  Orthostatics at last visit showed a significant drop in systolic blood pressure on standing up  I feel she should get a tilt table test which she will discuss with her cardiologist Dr. Bhatti  I do not want to start her on any medications such as midodrine or fludrocortisone as her baseline blood pressure is high at home  I have however asked her to keep herself well-hydrated and start wearing above-knee moderate compression stockings for increased venous return    2.  Tension headache  I will start her on Inderal LA 60 mg at night which will hopefully help with her headaches and her blood pressure (and possibly even with her vasovagal episodes)    3.  Carotid artery stenosis  CT angiogram of the neck showed 25% right and 40% left ICA stenosis.    She should continue aspirin 81 mg daily.  Can also start Lipitor 20 mg daily for goal LDL of less than 100.  Her LDL was 102    4.  Smoker  I have counseled her to gradually cut down and stop smoking  Counseling given for over 3 minutes    Return in about 6 weeks (around 10/15/2020).     .     Lis Israel MD

## 2020-09-04 ENCOUNTER — TELEPHONE (OUTPATIENT)
Dept: CARDIOLOGY | Facility: CLINIC | Age: 55
End: 2020-09-04

## 2020-09-04 NOTE — TELEPHONE ENCOUNTER
----- Message from Natalia Bhatti MD sent at 9/3/2020  5:00 PM EDT -----  Please notify her that the stress test and echo are within normal limits.  Continue same treatment and keep scheduled appointment for follow-up.

## 2020-09-11 ENCOUNTER — OFFICE VISIT (OUTPATIENT)
Dept: CARDIOLOGY | Facility: CLINIC | Age: 55
End: 2020-09-11

## 2020-09-11 VITALS
WEIGHT: 229 LBS | BODY MASS INDEX: 39.09 KG/M2 | HEIGHT: 64 IN | SYSTOLIC BLOOD PRESSURE: 135 MMHG | DIASTOLIC BLOOD PRESSURE: 77 MMHG | HEART RATE: 45 BPM

## 2020-09-11 DIAGNOSIS — R00.1 BRADYCARDIA: ICD-10-CM

## 2020-09-11 DIAGNOSIS — Z72.0 TOBACCO ABUSE: ICD-10-CM

## 2020-09-11 DIAGNOSIS — I10 ESSENTIAL HYPERTENSION: ICD-10-CM

## 2020-09-11 DIAGNOSIS — R55 SYNCOPE AND COLLAPSE: Primary | ICD-10-CM

## 2020-09-11 PROCEDURE — 99442 PR PHYS/QHP TELEPHONE EVALUATION 11-20 MIN: CPT | Performed by: PHYSICIAN ASSISTANT

## 2020-09-11 NOTE — PROGRESS NOTES
Encounter Date:09/11/2020      Patient ID: Ciara Hector is a 55 y.o. female.    Yenny Aldrich APRN    Chief Complaint: Loss of Consciousness  You have chosen to receive care through a telephone visit. Do you consent to use a telephone visit for your medical care today? Yes    PROBLEM LIST:  Patient Active Problem List    Diagnosis    • Dizziness of unknown cause         • Bradycardia         • Syncope and collapse                · Echo 3-27-15: Normal LV, EF 60%.  Mild pulmonary hypertension, RVSP 35 mmHg.  Negative bubble study     • Essential hypertension         • Bilateral carotid artery disease (CMS/HCC)                Duplex 3-26-15: mild plague bilaterally     • Tobacco abuse         • DM (diabetes mellitus), type 2 (CMS/HCC)         • Abnormal EKG         • GERD (gastroesophageal reflux disease)    • History of transient ischemic attack (TIA)    • DDD (degenerative disc disease), cervical    • Bilateral headaches            History of Present Illness  Patient presents today for follow-up with a history of syncope and collapse, hypertension, nonobstructive carotid artery disease and tobacco abuse.  Since our last visit she has followed up with neurology who recommended a tilt table study.  She continues to complain of dizziness and lightheadedness with near syncope on a daily basis.  She has had no recent sayda syncope.  She has been checking her glucoses and they typically run about 120.  She is following her blood pressures daily and has systolic blood pressures generally ranging between 140 to 160 mmHg systolic.  She is drinking a lot of water and is salting her foods.  She is not been able to find compression stockings.  She denies exertional chest pain or dyspnea, no orthopnea no PND no claudication no lower extremity edema.  She is compliant with her current medical regimen reports no significant adverse side effects.    Allergies   Allergen Reactions   • Dilaudid [Hydromorphone Hcl]  "Anaphylaxis   • Tape Other (See Comments)     Plastic Tape causes Blisters         Current Outpatient Medications:   •  aspirin 81 MG EC tablet, Take 81 mg by mouth Daily., Disp: , Rfl:   •  atorvastatin (Lipitor) 20 MG tablet, Take 1 tablet by mouth Daily., Disp: 30 tablet, Rfl: 11  •  esomeprazole (nexIUM) 40 MG capsule, Take 40 mg by mouth Daily., Disp: , Rfl:   •  IBUPROFEN PO, Take  by mouth As Needed., Disp: , Rfl:   •  lisinopril (PRINIVIL,ZESTRIL) 10 MG tablet, Take 10 mg by mouth As Needed (when systolic reading high)., Disp: , Rfl:   •  meloxicam (MOBIC) 7.5 MG tablet, Take 7.5 mg by mouth Daily., Disp: , Rfl:   •  Multiple Vitamins-Minerals (MULTIVITAMIN ADULT PO), Take 1 tablet by mouth Daily., Disp: , Rfl:   •  propranolol LA (Inderal LA) 60 MG 24 hr capsule, Take 1 capsule by mouth Daily., Disp: 30 capsule, Rfl: 5  •  sertraline (ZOLOFT) 25 MG tablet, Take 50 mg by mouth Daily., Disp: , Rfl:     The following portions of the patient's history were reviewed and updated as appropriate: allergies, current medications, past family history, past medical history, past social history, past surgical history and problem list.    Review of Systems   Constitution: Negative for diaphoresis, malaise/fatigue and weight gain.   Cardiovascular: Positive for near-syncope. Negative for chest pain, claudication, dyspnea on exertion, irregular heartbeat, leg swelling, orthopnea, palpitations, paroxysmal nocturnal dyspnea and syncope.   Respiratory: Negative for cough, shortness of breath, sleep disturbances due to breathing and wheezing.    Hematologic/Lymphatic: Negative for bleeding problem.   Musculoskeletal: Negative for muscle cramps, muscle weakness and myalgias.   Gastrointestinal: Negative for heartburn.   Neurological: Positive for light-headedness. Negative for weakness.         Objective:     /77 (BP Location: Left arm, Patient Position: Sitting)   Pulse (!) 45   Ht 162.6 cm (64\")   Wt 104 kg (229 lb)  "  BMI 39.31 kg/m²    Body mass index is 39.31 kg/m².     Physical Exam   Constitutional: She is oriented to person, place, and time. No distress.   Neurological: She is alert and oriented to person, place, and time.   Psychiatric: She has a normal mood and affect. Thought content normal.   No conversational dyspnea    Lab Review:     Procedures             Assessment:      Diagnosis Plan   1. Syncope and collapse likely vasovagal Tilt Table study  She had an event monitor last month during which she had 1 syncopal episode and one presyncopal episode.  Her heart rates were 64 and 72 bpm respectively.  Her minimum heart rate was 53 she had 3 episodes of tachycardia 1 of which was PSVT for 7 beats  She has requested a prescription for compression stockings which we will mail to her.   2. Essential hypertension   acceptable today however overall her blood pressure is not to goal.  It is tempting to increase her antihypertensives however, I agree with neurology that this would likely increase her syncopal episodes.  For now we will continue to monitor.   3. Bradycardia   asymptomatic and self-limiting   4. Tobacco abuse   she is down to about 4 cigarettes/day and I have encouraged her to continue weaning off.     Plan:     Stable cardiac status.  Continue current medications.   in 6 months, sooner as needed.  Thank you for allowing us to participate in the care of your patient.     Electronically signed by BEN Neville, 09/11/20, 9:34 AM.      Please note that portions of this note may have been completed with a voice recognition program. Efforts were made to edit the dictations, but occasionally words are mis-translated.      This visit has been rescheduled as a phone visit to comply with patient safety concerns in accordance with CDC recommendations. Total time of discussion was 15 minutes.

## 2020-09-23 ENCOUNTER — HOSPITAL ENCOUNTER (OUTPATIENT)
Dept: CARDIOLOGY | Facility: HOSPITAL | Age: 55
Discharge: HOME | End: 2020-09-23
Admitting: PHYSICIAN ASSISTANT

## 2020-09-23 PROCEDURE — 93660 TILT TABLE EVALUATION: CPT | Performed by: INTERNAL MEDICINE

## 2020-09-23 PROCEDURE — 93660 TILT TABLE EVALUATION: CPT

## 2020-11-05 ENCOUNTER — TELEMEDICINE (OUTPATIENT)
Dept: NEUROLOGY | Facility: CLINIC | Age: 55
End: 2020-11-05

## 2020-11-05 DIAGNOSIS — G44.209 TENSION HEADACHE: ICD-10-CM

## 2020-11-05 DIAGNOSIS — I65.23 BILATERAL CAROTID ARTERY STENOSIS: ICD-10-CM

## 2020-11-05 DIAGNOSIS — R55 SYNCOPE AND COLLAPSE: Primary | ICD-10-CM

## 2020-11-05 PROCEDURE — 99214 OFFICE O/P EST MOD 30 MIN: CPT | Performed by: PSYCHIATRY & NEUROLOGY

## 2020-11-05 NOTE — PROGRESS NOTES
Subjective:    CC: Ciara Hector is seen today for syncope, dizziness and headaches.    HPI:  Current visit-since the last visit patient had a tilt table test which was positive for vasovagal syncope as she had a syncopal episode 11 minutes into the test.  Her baseline blood pressure was 90/68.  However she was never started on any medications by cardiology.  Patient states she has had about 4 more episodes of passing out since September when she passes out briefly along with feeling hot and clammy.  She has started wearing compression stockings.  Her headaches are slightly better after she started Inderal LA 60 mg daily.  Her blood pressure is also better controlled as it is now running between 130-140/55-90 whereas before it was in the 160s/90s.    Last visit-since the last visit patient continues to have dizziness where she feels off balance and lightheadedness on standing up and walking.  She has also had 3 more syncopal episodes where she felt hot/clammy and passed out.  During 1 of these episodes she was laying down and during to others she was sitting down.  She was wearing her event monitor during the spells and it either showed sinus rhythm or sinus bradycardia/tachycardia.  There was also paroxysmal SVT but no atrial fibrillation.  Had a stress test that was low risk and an echocardiogram the results of which are still pending.  She also had a MRI brain that I personally reviewed today that showed minimal chronic ischemic changes but no acute intracranial abnormalities as well as no old infarcts.  CT angiogram of the head and neck showed minimal about 40% left and 25% right carotid stenosis.  She is currently on aspirin 81 mg.  Her last lipid panel in May was as follows-, , , HDL 59.  Patient smokes about 5 cigarettes a day.  Today patient is also complaining of near daily occipital headaches that go around like a band.  She denies having any photophobia or phonophobia with the  "headaches.  Also denies snoring much at night.  Her blood pressure at home is usually in the 160s/90s.    Initial [id number]-old female with a past medical history of hypertension, diabetes mellitus type 2 (well controlled off medications), arthritis presents with dizziness/syncopal episodes.  As per patient she started getting extremely dizzy in January this year.  She describes the dizziness as a feeling of the room spinning around her as well as a sense of constant imbalance while walking as if being \"drunk \".  The dizziness can be provoked by sudden head movement or by standing up but is present all the time now.  A few months ago she also started to have fatigue, weakness as well as frequent headaches that start off in the back of her head and radiate to the front like a band.  She states that her ears hurt too.  Had blood work that was unremarkable including a blood glucose level and imaging of the cervical spine which showed degenerative changes and was told by her PCP that her symptoms could be due to that however she saw Dr. Caballero who did not think that she was a surgical candidate right now or that her symptoms were being caused by a pinched nerve.  Patient states that she has also had 2 syncopal episodes in the past 3 weeks.  During the first episode she was eating in a restaurant with a friend when all of a sudden she felt hot even though there was air conditioning inside followed by passing out for a few minutes.  When she woke up she was extremely sweaty.  Her second episode happened a week ago when she got up to get something and suddenly fell down losing consciousness.  As per her son she was cold and clammy and her lips turned blue.  He also could not find a pulse on her for a few seconds.  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  I reviewed her last cardiac work-up in 2015 including an echocardiogram and a carotid Doppler.  Carotid Doppler showed bilateral plaques with no significant stenosis and echocardiogram showed a normal EF with mild pulmonary hypertension but no PFO.  Patient also says that she was started on lisinopril in May of this year as few of her readings in clinic for high however her baseline blood pressure has always been low.    The following portions of the patient's history were reviewed today and updated as of 07/23/2020  : allergies, current medications, past family history, past medical history, past social history, past surgical history and problem list  These document will be scanned to patient's chart.      Current Outpatient Medications:   •  aspirin 81 MG EC tablet, Take 81 mg by mouth Daily., Disp: , Rfl:   •  atorvastatin (Lipitor) 20 MG tablet, Take 1 tablet by mouth Daily., Disp: 30 tablet, Rfl: 11  •  esomeprazole (nexIUM) 40 MG capsule, Take 40 mg by mouth Daily., Disp: , Rfl:   •  IBUPROFEN PO, Take  by mouth As Needed., Disp: , Rfl:   •  lisinopril (PRINIVIL,ZESTRIL) 10 MG tablet, Take 10 mg by mouth As Needed (when systolic reading high)., Disp: , Rfl:   •  meloxicam (MOBIC) 7.5 MG tablet, Take 7.5 mg by mouth Daily., Disp: , Rfl:   •  metoprolol tartrate (LOPRESSOR) 25 MG tablet, Take 1 tablet at night for 1 week then increase to 1 tablet twice a day, Disp: 60 tablet, Rfl: 3  •  Multiple Vitamins-Minerals (MULTIVITAMIN ADULT PO), Take 1 tablet by mouth Daily., Disp: , Rfl:   •  sertraline (ZOLOFT) 25 MG tablet, Take 50 mg by mouth Daily., Disp: , Rfl:    Past Medical History:   Diagnosis Date   • Bradycardia    • Cancer (CMS/HCC)     uterus   • Diabetes mellitus (CMS/HCC)     Type 2   • GERD (gastroesophageal reflux disease)    • Hypertension    • Sciatic pain    • Syncope and collapse       Past Surgical History:   Procedure Laterality Date   • ABDOMINAL HYSTERECTOMY     • APPENDECTOMY     •  CHOLECYSTECTOMY     • DIAGNOSTIC LAPAROSCOPY      Lysis of Adhesions   • GASTRIC BYPASS     • LAPAROSCOPIC GASTRIC BANDING     • TUBAL ABDOMINAL LIGATION        Family History   Problem Relation Age of Onset   • Diabetes Mother    • Hypertension Mother    • Liver disease Mother    • No Known Problems Father       Social History     Socioeconomic History   • Marital status:      Spouse name: Not on file   • Number of children: Not on file   • Years of education: Not on file   • Highest education level: Not on file   Tobacco Use   • Smoking status: Current Every Day Smoker     Packs/day: 0.50     Years: 20.00     Pack years: 10.00     Types: Cigarettes   • Smokeless tobacco: Never Used   Substance and Sexual Activity   • Alcohol use: Yes     Comment: occ.   • Drug use: No   • Sexual activity: Defer     Review of Systems   Constitutional: Positive for fatigue.   Neurological: Positive for dizziness, syncope, light-headedness (feels like she is drunk at times, staggering etc.) and headache (Head hurts down the sides, including ear area).   All other systems reviewed and are negative.      Objective:    There were no vitals taken for this visit.    Neurology Exam:    General apperance: NAD.  Orthostatics at last visit showed a 18 point drop point drop in systolic blood pressure on standing up.  Blood pressure on laying down went up to 180/78 with a pulse of 58 and blood pressure on standing up was 162/79 with a heart rate of 59.     Mental status: Alert, awake and oriented to time place and person.    Recent and Remote memory: Intact.    Attention span and Concentration: Normal.     Language and Speech: Intact- No dysarthria.    Fluency, Naming , Repitition and Comprehension:  Intact    Cranial Nerves:   CN II: Visual fields are full. Intact. Fundi - Normal, No papillederma, Pupils - YOEL  CN III, IV and VI: Extraocular movements are intact. Normal saccades.  Mild end gaze nystagmus to the right  CN V: Facial  sensation is intact.   CN VII: Muscles of facial expression reveal no asymmetry. Intact.   CN VIII: Hearing is intact. Whispered voice intact.   CN IX and X: Palate elevates symmetrically. Intact  CN XI: Shoulder shrug is intact.   CN XII: Tongue is midline without evidence of atrophy or fasciculation.       Motor:  Intact    Rhomberg: Positive at last visit    Gait: Mildly ataxic while walking        Assessment and Plan:  1. Syncope and collapse  Most likely vasovagal in nature.    Tilt table test was positive for vasovagal syncope  I will start her on metoprolol 25 mg twice a day  I am hesitant to start fludrocortisone or midodrine due to her slightly high blood pressure at baseline  I have also told her to continue keeping herself well-hydrated, wearing above-knee moderate compression stockings, eating small frequent meals in a day and carrying out  contraction exercises of her legs    2.  Tension headache  Even though she has responded a little to Inderal LA for her headaches I will stop it and switch her to metoprolol instead which will hopefully help with her blood pressure and her vasovagal syncopal episodes    3.  Carotid artery stenosis  CT angiogram of the neck showed 25% right and 40% left ICA stenosis.    She should continue aspirin 81 mg daily and Lipitor 20 mg daily for goal LDL of less than 100.  Her LDL was 102    4.  Smoker  I counseled her last time to quit smoking but she continues to smoke    Return in about 6 weeks (around 12/17/2020).     .     Lis Israel MD

## 2020-11-13 ENCOUNTER — TELEPHONE (OUTPATIENT)
Dept: NEUROLOGY | Facility: CLINIC | Age: 55
End: 2020-11-13

## 2020-11-13 RX ORDER — AMITRIPTYLINE HYDROCHLORIDE 10 MG/1
10 TABLET, FILM COATED ORAL NIGHTLY
Qty: 30 TABLET | Refills: 5 | Status: SHIPPED | OUTPATIENT
Start: 2020-11-13 | End: 2020-12-28 | Stop reason: SDUPTHER

## 2020-11-13 NOTE — TELEPHONE ENCOUNTER
PATIENT CALLED IN AND IS HAVING BAD MIGRAINES SINCE STARTING NEW BETA BLOCKER LOPRESSOR. WANTING TO KNOW IF DR. GUZMÁN CAN PRESCRIBE SOMETHING FOR HEADACHES.    PLEASE CONTACT PATIENT WITH AN UPDATE. THANK YOU.    PH: [phone]

## 2020-11-13 NOTE — TELEPHONE ENCOUNTER
Tell the patient I have sent in a prescription of amitriptyline 10 mg to be taken at bedtime for the headaches.  It should also make her sleep better.  If this does is not helping then she can call me back in a few weeks and we can double the dose

## 2020-12-28 ENCOUNTER — TELEMEDICINE (OUTPATIENT)
Dept: NEUROLOGY | Facility: CLINIC | Age: 55
End: 2020-12-28

## 2020-12-28 DIAGNOSIS — R55 SYNCOPE AND COLLAPSE: Primary | ICD-10-CM

## 2020-12-28 DIAGNOSIS — G44.209 TENSION HEADACHE: ICD-10-CM

## 2020-12-28 DIAGNOSIS — I65.23 BILATERAL CAROTID ARTERY STENOSIS: ICD-10-CM

## 2020-12-28 PROCEDURE — 99213 OFFICE O/P EST LOW 20 MIN: CPT | Performed by: PSYCHIATRY & NEUROLOGY

## 2020-12-28 RX ORDER — AMITRIPTYLINE HYDROCHLORIDE 10 MG/1
20 TABLET, FILM COATED ORAL NIGHTLY
Qty: 60 TABLET | Refills: 5 | Status: SHIPPED | OUTPATIENT
Start: 2020-12-28 | End: 2021-06-28

## 2020-12-28 NOTE — PROGRESS NOTES
Subjective:    CC: Ciara Hector is seen today via video visit for syncope, dizziness and headaches.    HPI:  Current visit-patient states that since starting metoprolol 25 mg twice a day she has not had any more episodes of syncope.  She continues to have some dizziness on standing up but tries to get up slowly.  Is trying to keep herself well-hydrated but has not started wearing compression stockings yet.  Her blood pressure is also better controlled and it mostly runs in the 120s/130s.  Since switching from Inderal to metoprolol her headaches were worse hence I had started her on amitriptyline 10 mg nightly which did help with the headaches however she continues to get a few headaches each month.      Last visit-since the last visit patient had a tilt table test which was positive for vasovagal syncope as she had a syncopal episode 11 minutes into the test.  Her baseline blood pressure was 90/68.  However she was never started on any medications by cardiology.  Patient states she has had about 4 more episodes of passing out since September when she passes out briefly along with feeling hot and clammy.  She has started wearing compression stockings.  Her headaches are slightly better after she started Inderal LA 60 mg daily.  Her blood pressure is also better controlled as it is now running between 130-140/55-90 whereas before it was in the 160s/90s.    Last visit-since the last visit patient continues to have dizziness where she feels off balance and lightheadedness on standing up and walking.  She has also had 3 more syncopal episodes where she felt hot/clammy and passed out.  During 1 of these episodes she was laying down and during to others she was sitting down.  She was wearing her event monitor during the spells and it either showed sinus rhythm or sinus bradycardia/tachycardia.  There was also paroxysmal SVT but no atrial fibrillation.  Had a stress test that was low risk and an echocardiogram the  "results of which are still pending.  She also had a MRI brain that I personally reviewed today that showed minimal chronic ischemic changes but no acute intracranial abnormalities as well as no old infarcts.  CT angiogram of the head and neck showed minimal about 40% left and 25% right carotid stenosis.  She is currently on aspirin 81 mg.  Her last lipid panel in May was as follows-, , , HDL 59.  Patient smokes about 5 cigarettes a day.  Today patient is also complaining of near daily occipital headaches that go around like a band.  She denies having any photophobia or phonophobia with the headaches.  Also denies snoring much at night.  Her blood pressure at home is usually in the 160s/90s.    Initial [id number]-old female with a past medical history of hypertension, diabetes mellitus type 2 (well controlled off medications), arthritis presents with dizziness/syncopal episodes.  As per patient she started getting extremely dizzy in January this year.  She describes the dizziness as a feeling of the room spinning around her as well as a sense of constant imbalance while walking as if being \"drunk \".  The dizziness can be provoked by sudden head movement or by standing up but is present all the time now.  A few months ago she also started to have fatigue, weakness as well as frequent headaches that start off in the back of her head and radiate to the front like a band.  She states that her ears hurt too.  Had blood work that was unremarkable including a blood glucose level and imaging of the cervical spine which showed degenerative changes and was told by her PCP that her symptoms could be due to that however she saw Dr. Caballero who did not think that she was a surgical candidate right now or that her symptoms were being caused by a pinched nerve.  Patient states that she has also had 2 syncopal episodes in the past 3 weeks.  During the first episode she was eating in a restaurant with a friend " when all of a sudden she felt hot even though there was air conditioning inside followed by passing out for a few minutes.  When she woke up she was extremely sweaty.  Her second episode happened a week ago when she got up to get something and suddenly fell down losing consciousness.  As per her son she was cold and clammy and her lips turned blue.  He also could not find a pulse on her for a few seconds.  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  I reviewed her last cardiac work-up in 2015 including an echocardiogram and a carotid Doppler.  Carotid Doppler showed bilateral plaques with no significant stenosis and echocardiogram showed a normal EF with mild pulmonary hypertension but no PFO.  Patient also says that she was started on lisinopril in May of this year as few of her readings in clinic for high however her baseline blood pressure has always been low.    The following portions of the patient's history were reviewed today and updated as of 07/23/2020  : allergies, current medications, past family history, past medical history, past social history, past surgical history and problem list  These document will be scanned to patient's chart.      Current Outpatient Medications:   •  amitriptyline (ELAVIL) 10 MG tablet, Take 2 tablets by mouth Every Night., Disp: 60 tablet, Rfl: 5  •  aspirin 81 MG EC tablet, Take 81 mg by mouth Daily., Disp: , Rfl:   •  atorvastatin (Lipitor) 20 MG tablet, Take 1 tablet by mouth Daily., Disp: 30 tablet, Rfl: 11  •  esomeprazole (nexIUM) 40 MG capsule, Take 40 mg by mouth Daily., Disp: , Rfl:   •  IBUPROFEN PO, Take  by mouth As Needed., Disp: , Rfl:   •  lisinopril (PRINIVIL,ZESTRIL) 10 MG tablet, Take 10 mg by mouth As Needed (when systolic reading high)., Disp: , Rfl:   •  meloxicam (MOBIC) 7.5 MG  tablet, Take 7.5 mg by mouth Daily., Disp: , Rfl:   •  metoprolol tartrate (LOPRESSOR) 25 MG tablet, Take  1 tablet twice a day, Disp: 60 tablet, Rfl: 3  •  Multiple Vitamins-Minerals (MULTIVITAMIN ADULT PO), Take 1 tablet by mouth Daily., Disp: , Rfl:   •  sertraline (ZOLOFT) 25 MG tablet, Take 50 mg by mouth Daily., Disp: , Rfl:    Past Medical History:   Diagnosis Date   • Bradycardia    • Cancer (CMS/HCC)     uterus   • Diabetes mellitus (CMS/HCC)     Type 2   • GERD (gastroesophageal reflux disease)    • Hypertension    • Sciatic pain    • Syncope and collapse       Past Surgical History:   Procedure Laterality Date   • ABDOMINAL HYSTERECTOMY     • APPENDECTOMY     • CHOLECYSTECTOMY     • DIAGNOSTIC LAPAROSCOPY      Lysis of Adhesions   • GASTRIC BYPASS     • LAPAROSCOPIC GASTRIC BANDING     • TUBAL ABDOMINAL LIGATION        Family History   Problem Relation Age of Onset   • Diabetes Mother    • Hypertension Mother    • Liver disease Mother    • No Known Problems Father       Social History     Socioeconomic History   • Marital status:      Spouse name: Not on file   • Number of children: Not on file   • Years of education: Not on file   • Highest education level: Not on file   Tobacco Use   • Smoking status: Current Every Day Smoker     Packs/day: 0.50     Years: 20.00     Pack years: 10.00     Types: Cigarettes   • Smokeless tobacco: Never Used   Substance and Sexual Activity   • Alcohol use: Yes     Comment: occ.   • Drug use: No   • Sexual activity: Defer     Review of Systems   Constitutional: Positive for fatigue.   Neurological: Positive for dizziness, syncope, light-headedness (feels like she is drunk at times, staggering etc.) and headache (Head hurts down the sides, including ear area).   All other systems reviewed and are negative.      Objective:    There were no vitals taken for this visit.    Neurology Exam:    General apperance: NAD.  Orthostatics at last visit showed a 18 point drop point  drop in systolic blood pressure on standing up.  Blood pressure on laying down went up to 180/78 with a pulse of 58 and blood pressure on standing up was 162/79 with a heart rate of 59.     Mental status: Alert, awake and oriented to time place and person.    Recent and Remote memory: Intact.    Attention span and Concentration: Normal.     Language and Speech: Intact- No dysarthria.    Fluency, Naming , Repitition and Comprehension:  Intact    Cranial Nerves:   CN II: Visual fields are full. Intact. Fundi - Normal, No papillederma, Pupils - YOEL  CN III, IV and VI: Extraocular movements are intact. Normal saccades.  Mild end gaze nystagmus to the right  CN V: Facial sensation is intact.   CN VII: Muscles of facial expression reveal no asymmetry. Intact.   CN VIII: Hearing is intact. Whispered voice intact.   CN IX and X: Palate elevates symmetrically. Intact  CN XI: Shoulder shrug is intact.   CN XII: Tongue is midline without evidence of atrophy or fasciculation.       Motor:  Intact    Rhomberg: Positive at last visit    Gait: Regular        Assessment and Plan:  1. Syncope and collapse  Most likely vasovagal in nature.    Tilt table test was positive for vasovagal syncope  I have asked her to continue metoprolol 25 mg twice a day  If her symptoms worsen in the future then I may start her on low doses of midodrine or fludrocortisone  I have also told her to continue keeping herself well-hydrated and start wearing above-knee moderate compression stockings, eating small frequent meals in a day and carrying out  contraction exercises of her legs    2.  Tension headache  I have told her to increase her amitriptyline to 20 mg nightly.  If she has increased somnolence then I may switch her to nortriptyline    3.  Carotid artery stenosis  CT angiogram of the neck showed 25% right and 40% left ICA stenosis.    She should continue aspirin 81 mg daily and Lipitor 20 mg daily for goal LDL of less than 100.  Her LDL was  102    4.  Smoker  I counseled her last time to quit smoking but she continues to smoke    Return in about 3 months (around 3/28/2021).         Lis Israel MD

## 2021-04-09 ENCOUNTER — OFFICE VISIT (OUTPATIENT)
Dept: CARDIOLOGY | Facility: CLINIC | Age: 56
End: 2021-04-09

## 2021-04-09 VITALS
BODY MASS INDEX: 39.27 KG/M2 | HEART RATE: 61 BPM | WEIGHT: 230 LBS | DIASTOLIC BLOOD PRESSURE: 96 MMHG | HEIGHT: 64 IN | SYSTOLIC BLOOD PRESSURE: 170 MMHG

## 2021-04-09 DIAGNOSIS — R00.1 BRADYCARDIA: ICD-10-CM

## 2021-04-09 DIAGNOSIS — R55 SYNCOPE AND COLLAPSE: Primary | ICD-10-CM

## 2021-04-09 DIAGNOSIS — I10 ESSENTIAL HYPERTENSION: ICD-10-CM

## 2021-04-09 PROCEDURE — 99441 PR PHYS/QHP TELEPHONE EVALUATION 5-10 MIN: CPT | Performed by: INTERNAL MEDICINE

## 2021-04-09 NOTE — PROGRESS NOTES
[facility] Cardiology    Encounter Date:2021    Patient ID: Ciara Hector is a 55 y.o. female.  : 1965     PCP: Yenny Aldrich APRN       Chief Complaint: Syncope and collapse      PROBLEM LIST:  1. Dizziness of unknown disease  2. Bradycardia  3. Syncope and collapse  a. Echo 3-27-15: Normal LV, EF 60%.  Mild pulmonary hypertension, RVSP 35 mmHg.  Negative bubble study.  b. Event monitor, 2020: SR with PVC's and PSVT 7 beats, rate 149 bpm.   c. MPS, 2020: EF 75%. The patient reported mild chest tightness and shortness of breath which resolved in early recovery. No evidence of ischemia. Low risk study.   d. Echocardiogram, 2020: EF 60%. Trace MR and TR with normal RVSP. Left atrial cavity size is mildly dilated.  e. Tilt table test, 2020: Baseline rhythm sinus bradycardia 48 bpm, remained bradycardic throughout the test, reported 54 bpm at time of syncope. Baseline blood pressure 90/70 mmHg. Blood pressure could not be auscultated during the tilt exam. Recovery blood pressure charted as 92/68 mmHg. Patient experienced syncope 11 minutes into tilt table. The test was positive for vasovagal syncope. The patient has baseline bradycardia and borderline hypotension.  4. Essential hypertension  5. Bilateral carotid artery disease  a. Duplex 3-26-15: mild plague bilaterally  6. Tobacco abuse  7. DM, type 2  8. Abnormal EKG  9. GERD  10. History of TIA  11. DDD  12. Bilateral headaches    History of Present Illness  Patient has a telephone visit today for a 6 month follow-up with a history of syncope and collapse, bradycardia, and cardiac risk factors. Since last visit, she has done quite well from cardiac standpoint and has not had any recurrent episodes of syncope.  A few times she has felt lightheaded and states that today she is spacing a headache and facial flushing and numbness.  She has taken some Aleve without relief.  At her neurologist  recommendations she has been avoiding standing for more than 10 minutes.  Denies chest pain palpitations edema orthopnea or PND.  Did not need any prescription refills.    Allergies   Allergen Reactions   • Dilaudid [Hydromorphone Hcl] Anaphylaxis   • Tape Other (See Comments)     Plastic Tape causes Blisters         Current Outpatient Medications:   •  amitriptyline (ELAVIL) 10 MG tablet, Take 2 tablets by mouth Every Night., Disp: 60 tablet, Rfl: 5  •  aspirin 81 MG EC tablet, Take 81 mg by mouth Daily., Disp: , Rfl:   •  atorvastatin (Lipitor) 20 MG tablet, Take 1 tablet by mouth Daily., Disp: 30 tablet, Rfl: 11  •  esomeprazole (nexIUM) 40 MG capsule, Take 40 mg by mouth Daily., Disp: , Rfl:   •  IBUPROFEN PO, Take  by mouth As Needed., Disp: , Rfl:   •  lisinopril (PRINIVIL,ZESTRIL) 10 MG tablet, Take 10 mg by mouth Daily., Disp: , Rfl:   •  meloxicam (MOBIC) 7.5 MG tablet, Take 7.5 mg by mouth Daily., Disp: , Rfl:   •  metoprolol tartrate (LOPRESSOR) 25 MG tablet, Take  1 tablet twice a day, Disp: 60 tablet, Rfl: 3  •  Multiple Vitamins-Minerals (MULTIVITAMIN ADULT PO), Take 1 tablet by mouth Daily., Disp: , Rfl:   •  sertraline (ZOLOFT) 25 MG tablet, Take 50 mg by mouth Daily., Disp: , Rfl:     The following portions of the patient's history were reviewed and updated as appropriate: allergies, current medications, past family history, past medical history, past social history, past surgical history and problem list.    ROS  Review of Systems   Constitution: Negative for chills, fever, fatigue, generalized weakness.   Cardiovascular: Pertinent positives in HPI, otherwise negative.  Respiratory: Pertinent positives in HPI, otherwise negative.  HENT: Negative for ear pain, nosebleeds, and tinnitus.  Gastrointestinal: Negative for abdominal pain, constipation, diarrhea, nausea and vomiting.   Genitourinary: No urinary symptoms.  Musculoskeletal: Negative for muscle cramps.  Neurological: Negative for headaches,  "loss of balance, numbness, and symptoms of stroke.  Psychiatric: Normal mental status.     All other systems reviewed and are negative.    Objective:     /96 (BP Location: Left arm, Patient Position: Sitting)   Pulse 61   Ht 162.6 cm (64\")   Wt 104 kg (230 lb)   BMI 39.48 kg/m²        Physical Exam  No physical exam performed secondary to telephone visit.   Vitals reviewed.  Reported blood pressure was elevated today however she went through a long list of recorded readings from her monitor and most blood pressures have been in 130s systolic.    Lab Review:   Lab date: 07/24/2020  • BMP: Glu 86, BUN 18, Creat 0.87, eGFR 62, Na 133, K 5.3, Cl 98, CO2 30, Ca 9.5  • CBC: WBC 9.5, RBC 4.14., HGB 9.2, HCT 30.6, MCV 73.9, MCH 22.2,     Lab date: 04/30/2020  • FLP: , , HDL 59,       Lab Results   Component Value Date    CREATININE 0.80 07/29/2020       Procedures       Assessment:      Diagnosis Plan   1. Syncope and collapse, vasovagal based on abnormal tilt table study.   Advised her to maintain good hydration and to sit down if she were to experience near syncope to avoid syncope and fall/trauma.   2. Essential hypertension   continue current medications.   3. Bradycardia   no significant arrhythmias noted on cardiac monitor.     Plan:   Overall cardiac status is stable.  Continue current medications.   We discussed that there is no specific treatment for her condition and maintenance of good hydration, avoidance of prolonged standing and sitting down in the event of lightheadedness is the best strategy.    If the episodes become recurrent or worse she may need further assessment.    For her headache I advised her to try Excedrin or extra strength Tylenol.    Follow-up with PCP for further care, follow-up with us in 1 year, sooner as needed.  Advanced care planning to be discussed with RICKIE Sterling.   Thank you for allowing us to participate in the care of your patient.     This " patient has consented to a telehealth visit via telephone. The visit was scheduled as a telephone visit to comply with patient safety concerns in accordance with CDC recommendations.  All vitals recorded within this visit are reported by the patient.  I spent 15 minutes in total including but not limited to the 10 minutes spent in direct conversation with this patient.       Natalia Bhatti MD, FAC, [facility]      Please note that portions of this note may have been completed with a voice recognition program. Efforts were made to edit the dictations, but occasionally words are mistranscribed.

## 2021-06-28 DIAGNOSIS — G44.209 TENSION HEADACHE: Primary | ICD-10-CM

## 2021-06-28 RX ORDER — AMITRIPTYLINE HYDROCHLORIDE 10 MG/1
TABLET, FILM COATED ORAL
Qty: 60 TABLET | Refills: 1 | Status: SHIPPED | OUTPATIENT
Start: 2021-06-28 | End: 2021-09-27

## 2021-09-13 RX ORDER — ATORVASTATIN CALCIUM 20 MG/1
TABLET, FILM COATED ORAL
Qty: 30 TABLET | Refills: 11 | Status: SHIPPED | OUTPATIENT
Start: 2021-09-13

## 2021-09-23 DIAGNOSIS — G44.209 TENSION HEADACHE: ICD-10-CM

## 2021-09-24 DIAGNOSIS — G44.209 TENSION HEADACHE: ICD-10-CM

## 2021-09-27 RX ORDER — AMITRIPTYLINE HYDROCHLORIDE 10 MG/1
TABLET, FILM COATED ORAL
Qty: 60 TABLET | Refills: 1 | Status: SHIPPED | OUTPATIENT
Start: 2021-09-27 | End: 2021-11-23

## 2021-11-23 DIAGNOSIS — G44.209 TENSION HEADACHE: ICD-10-CM

## 2021-11-23 RX ORDER — AMITRIPTYLINE HYDROCHLORIDE 10 MG/1
TABLET, FILM COATED ORAL
Qty: 60 TABLET | Refills: 1 | Status: SHIPPED | OUTPATIENT
Start: 2021-11-23

## 2022-01-25 DIAGNOSIS — G44.209 TENSION HEADACHE: ICD-10-CM

## 2022-01-25 RX ORDER — AMITRIPTYLINE HYDROCHLORIDE 10 MG/1
TABLET, FILM COATED ORAL
Qty: 60 TABLET | Refills: 1 | OUTPATIENT
Start: 2022-01-25

## 2023-01-03 ENCOUNTER — TELEPHONE (OUTPATIENT)
Dept: NEUROLOGY | Facility: CLINIC | Age: 58
End: 2023-01-03
Payer: MEDICARE

## 2023-01-03 NOTE — TELEPHONE ENCOUNTER
Caller: Ciara Hector    Relationship to patient: Self    Best call back number: [phone]    Chief complaint: Syncope and collapse, Tension headache, Bilateral carotid artery stenosis    Type of visit: FOLLOW UP    Requested date: 1/9/23     If rescheduling, when is the original appointment: NA     Additional notes: PATIENT WOULD LIKE TO KNOW IF THIS VISIT COULD BE A VIDEO VISIT AS SHE [place] AND CAN'T DRIVE HERSELF.

## 2023-01-04 NOTE — TELEPHONE ENCOUNTER
PATIENT CALLED BACK CHECKING ON THE STATUS OF HER REQUEST FOR A VIDEO VISIT.    SHE IS ALSO WANTING TO KNOW IF DR. GUZMÁN WOULD WANT ANY LAB WORK DONE BEFORE HER VISIT.    PATIENT IS SEEING HER PCP TOMORROW AND CAN HAVE THE LAB WORK DONE    PLEASE CALL AND ADVISE     THANK YOU

## 2023-01-05 NOTE — TELEPHONE ENCOUNTER
Called Ciara back and informed per Lindy does not need any lab work at this time and got her fup changed to a video visit and she does have my chart. Thanks!

## 2023-01-09 ENCOUNTER — TELEMEDICINE (OUTPATIENT)
Dept: NEUROLOGY | Facility: CLINIC | Age: 58
End: 2023-01-09
Payer: MEDICARE

## 2023-01-09 DIAGNOSIS — R42 VERTIGO: Primary | ICD-10-CM

## 2023-01-09 DIAGNOSIS — G43.009 MIGRAINE WITHOUT AURA AND WITHOUT STATUS MIGRAINOSUS, NOT INTRACTABLE: ICD-10-CM

## 2023-01-09 DIAGNOSIS — I65.22 STENOSIS OF LEFT CAROTID ARTERY: ICD-10-CM

## 2023-01-09 PROCEDURE — 99214 OFFICE O/P EST MOD 30 MIN: CPT | Performed by: PSYCHIATRY & NEUROLOGY

## 2023-01-09 RX ORDER — MIDODRINE HYDROCHLORIDE 5 MG/1
5 TABLET ORAL
Qty: 60 TABLET | Refills: 3 | Status: SHIPPED | OUTPATIENT
Start: 2023-01-09

## 2023-01-09 RX ORDER — RIZATRIPTAN BENZOATE 10 MG/1
TABLET ORAL
Qty: 8 TABLET | Refills: 3 | Status: SHIPPED | OUTPATIENT
Start: 2023-01-09

## 2023-01-09 NOTE — PROGRESS NOTES
Subjective:    CC: Ciara Hector is seen today via video visit for syncope, dizziness and headaches.      Current visit- I am seeing the patient after more than 2 years.  She states that since 23 December she has been having bouts of vertigo mainly when turning her head accompanied by nausea.  Her PCP prescribed her meclizine that she has just started to take.  She has also been feeling weak all over since these episodes started denies snoring at night but does feel tired during the daytime.  She has also had more episodes of postural lightheadedness in the past few weeks and 2 episodes of syncope last week.  She continues to take metoprolol 25 mg twice a day.  With regards to her headaches she increased her dose of amitriptyline to 25 mg nightly but has continued to have about 3-4 severe headaches a month for which she takes Excedrin that may or may not be helpful.    Last visit-patient states that since starting metoprolol 25 mg twice a day she has not had any more episodes of syncope.  She continues to have some dizziness on standing up but tries to get up slowly.  Is trying to keep herself well-hydrated but has not started wearing compression stockings yet.  Her blood pressure is also better controlled and it mostly runs in the 120s/130s.  Since switching from Inderal to metoprolol her headaches were worse hence I had started her on amitriptyline 10 mg nightly which did help with the headaches however she continues to get a few headaches each month.      Last visit-since the last visit patient had a tilt table test which was positive for vasovagal syncope as she had a syncopal episode 11 minutes into the test.  Her baseline blood pressure was 90/68.  However she was never started on any medications by cardiology.  Patient states she has had about 4 more episodes of passing out since September when she passes out briefly along with feeling hot and clammy.  She has started wearing compression stockings.  Her  headaches are slightly better after she started Inderal LA 60 mg daily.  Her blood pressure is also better controlled as it is now running between 130-140/55-90 whereas before it was in the 160s/90s.    Last visit-since the last visit patient continues to have dizziness where she feels off balance and lightheadedness on standing up and walking.  She has also had 3 more syncopal episodes where she felt hot/clammy and passed out.  During 1 of these episodes she was laying down and during to others she was sitting down.  She was wearing her event monitor during the spells and it either showed sinus rhythm or sinus bradycardia/tachycardia.  There was also paroxysmal SVT but no atrial fibrillation.  Had a stress test that was low risk and an echocardiogram the results of which are still pending.  She also had a MRI brain that I personally reviewed today that showed minimal chronic ischemic changes but no acute intracranial abnormalities as well as no old infarcts.  CT angiogram of the head and neck showed minimal about 40% left and 25% right carotid stenosis.  She is currently on aspirin 81 mg.  Her last lipid panel in May was as follows-, , , HDL 59.  Patient smokes about 5 cigarettes a day.  Today patient is also complaining of near daily occipital headaches that go around like a band.  She denies having any photophobia or phonophobia with the headaches.  Also denies snoring much at night.  Her blood pressure at home is usually in the 160s/90s.    Initial ghjth-40-ccif-old female with a past medical history of hypertension, diabetes mellitus type 2 (well controlled off medications), arthritis presents with dizziness/syncopal episodes.  As per patient she started getting extremely dizzy in January this year.  She describes the dizziness as a feeling of the room spinning around her as well as a sense of constant imbalance while walking as if being \"drunk \".  The dizziness can be provoked by sudden head  movement or by standing up but is present all the time now.  A few months ago she also started to have fatigue, weakness as well as frequent headaches that start off in the back of her head and radiate to the front like a band.  She states that her ears hurt too.  Had blood work that was unremarkable including a blood glucose level and imaging of the cervical spine which showed degenerative changes and was told by her PCP that her symptoms could be due to that however she saw Dr. Caballero who did not think that she was a surgical candidate right now or that her symptoms were being caused by a pinched nerve.  Patient states that she has also had 2 syncopal episodes in the past 3 weeks.  During the first episode she was eating in a restaurant with a friend when all of a sudden she felt hot even though there was air conditioning inside followed by passing out for a few minutes.  When she woke up she was extremely sweaty.  Her second episode happened a week ago when she got up to get something and suddenly fell down losing consciousness.  As per her son she was cold and clammy and her lips turned blue.  He also could not find a pulse on her for a few seconds.  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  I reviewed her last cardiac work-up in 2015 including an echocardiogram and a carotid Doppler.  Carotid Doppler showed bilateral plaques with no significant stenosis and echocardiogram showed a normal EF with mild pulmonary hypertension but no PFO.  Patient also says that she was started on lisinopril in May of this year as few of her readings in clinic for high however her baseline blood pressure has always been low.    The following portions of the patient's history were reviewed today and updated as of 07/23/2020  : allergies, current medications,  past family history, past medical history, past social history, past surgical history and problem list  These document will be scanned to patient's chart.      Current Outpatient Medications:   •  amitriptyline (ELAVIL) 10 MG tablet, TAKE TWO TABLETS BY MOUTH EVERY NIGHT AT BEDTIME, Disp: 60 tablet, Rfl: 1  •  aspirin 81 MG EC tablet, Take 81 mg by mouth Daily., Disp: , Rfl:   •  atorvastatin (LIPITOR) 20 MG tablet, TAKE ONE TABLET BY MOUTH DAILY, Disp: 30 tablet, Rfl: 11  •  esomeprazole (nexIUM) 40 MG capsule, Take 40 mg by mouth Daily., Disp: , Rfl:   •  IBUPROFEN PO, Take  by mouth As Needed., Disp: , Rfl:   •  lisinopril (PRINIVIL,ZESTRIL) 10 MG tablet, Take 10 mg by mouth Daily., Disp: , Rfl:   •  meloxicam (MOBIC) 7.5 MG tablet, Take 7.5 mg by mouth Daily., Disp: , Rfl:   •  metoprolol tartrate (LOPRESSOR) 25 MG tablet, TAKE ONE TABLET BY MOUTH TWICE A DAY, Disp: 60 tablet, Rfl: 2  •  midodrine (PROAMATINE) 5 MG tablet, Take 1 tablet by mouth 2 (Two) Times a Day Before Meals. Avoid lying down for 1 to 2 hours after taking the medication, Disp: 60 tablet, Rfl: 3  •  Multiple Vitamins-Minerals (MULTIVITAMIN ADULT PO), Take 1 tablet by mouth Daily., Disp: , Rfl:   •  rizatriptan (Maxalt) 10 MG tablet, Take 1 tablet at onset of headache.  May repeat once in 2 hours.  Do not take more than 2 tabs a day or 8 tabs a month, Disp: 8 tablet, Rfl: 3  •  sertraline (ZOLOFT) 25 MG tablet, Take 50 mg by mouth Daily., Disp: , Rfl:    Past Medical History:   Diagnosis Date   • Bradycardia    • Cancer (CMS/HCC)     uterus   • Diabetes mellitus (CMS/HCC)     Type 2   • GERD (gastroesophageal reflux disease)    • Hypertension    • Sciatic pain    • Syncope and collapse       Past Surgical History:   Procedure Laterality Date   • ABDOMINAL HYSTERECTOMY     • APPENDECTOMY     • CHOLECYSTECTOMY     • DIAGNOSTIC LAPAROSCOPY      Lysis of Adhesions   • GASTRIC BYPASS     • LAPAROSCOPIC GASTRIC BANDING     • TUBAL ABDOMINAL LIGATION         Family History   Problem Relation Age of Onset   • Diabetes Mother    • Hypertension Mother    • Liver disease Mother    • No Known Problems Father       Social History     Socioeconomic History   • Marital status:    Tobacco Use   • Smoking status: Every Day     Packs/day: 0.50     Years: 20.00     Pack years: 10.00     Types: Cigarettes   • Smokeless tobacco: Never   Vaping Use   • Vaping Use: Never used   Substance and Sexual Activity   • Alcohol use: Yes     Comment: occ.   • Drug use: No   • Sexual activity: Defer     Review of Systems   Constitutional: Positive for fatigue.   Neurological: Positive for dizziness, syncope, light-headedness (feels like she is drunk at times, staggering etc.) and headache (Head hurts down the sides, including ear area).   All other systems reviewed and are negative.      Objective:    There were no vitals taken for this visit.    Neurology Exam:    General apperance: NAD.  Orthostatics at last visit showed a 18 point drop point drop in systolic blood pressure on standing up.  Blood pressure on laying down went up to 180/78 with a pulse of 58 and blood pressure on standing up was 162/79 with a heart rate of 59.     Mental status: Alert, awake and oriented to time place and person.    Recent and Remote memory: Intact.    Attention span and Concentration: Normal.     Language and Speech: Intact- No dysarthria.    Fluency, Naming , Repitition and Comprehension:  Intact    Cranial Nerves:   CN II: Visual fields are full. Intact. Fundi - Normal, No papillederma, Pupils - YOEL  CN III, IV and VI: Extraocular movements are intact. Normal saccades.  Mild end gaze nystagmus to the right  CN V: Facial sensation is intact.   CN VII: Muscles of facial expression reveal no asymmetry. Intact.   CN VIII: Hearing is intact. Whispered voice intact.   CN IX and X: Palate elevates symmetrically. Intact  CN XI: Shoulder shrug is intact.   CN XII: Tongue is midline without evidence of  atrophy or fasciculation.       Motor:  Intact    Rhomberg: Positive at last visit    Gait: Regular        Assessment and Plan:  1. Syncope and collapse  Most likely vasovagal in nature.    Tilt table test was positive for vasovagal syncope  I have asked her to continue metoprolol 25 mg twice a day  I will also start her on midodrine 5 mg twice daily  I have also told her to continue keeping herself well-hydrated and start wearing above-knee moderate compression stockings, eating small frequent meals in a day and carrying out  contraction exercises of her legs    2.  Migraine headache  She should continue amitriptyline 25 mg nightly  I will prescribe her Maxalt 10 mg to be taken as needed at the onset of her headache instead of Excedrin    3.  Carotid artery stenosis  CT angiogram of the neck showed 25% right and 40% left ICA stenosis.    Since she is having more dizziness and vertigo I will repeat a carotid Doppler.  She should continue aspirin 81 mg daily and Lipitor 20 mg daily for goal LDL of less than 100.  Her LDL was 102    4.  Vertigo  She could have benign positional vertigo  I will however get a MRI brain to rule out any posterior circulation stroke.  She can continue meclizine as needed      Return in about 6 weeks (around 2/20/2023).     I spent 25 minutes with the patient out of which over 20 minutes was spent face to face.    Lis Israel MD

## 2023-01-17 ENCOUNTER — TELEPHONE (OUTPATIENT)
Dept: NEUROLOGY | Facility: CLINIC | Age: 58
End: 2023-01-17

## 2023-01-17 NOTE — TELEPHONE ENCOUNTER
Caller: CECILIA    Relationship: [facility]    Best call back number: [phone]    What was the call regarding: NEED A P.A FOR PT INSURANCE. PT HAS [employer] . MEDICARE ADVANTAGE    Do you require a callback: YES ASAP      PLEASE ASAP PT IS SCHED TOMORROW AT 1:30 PM       PLEASE ADVISE.

## 2023-01-18 NOTE — TELEPHONE ENCOUNTER
Earlier today  I s/w Ciara and Gloria at [facility] and she had shown up for her US across the street so s/w both depts and patient as they said because she has [place] Medicare Advantage it does have to have an authorization so they need an auth on both of these.    Cancelled scans for today and apologized to patient as believe where she had medicare we thought no auth was necessary.    Notified will let our covering referral coordinator know as well and once it is authorized Reynaldo Gleason said they have some pretty soon openings so should be able to get her back in quickly.    Patient was understanding as did not want her to receive some giant bill if they ended up not being approved.

## 2023-01-18 NOTE — TELEPHONE ENCOUNTER
Caller: ALFRED  Relationship to Patient: SELF  Phone Number: [phone]    Reason for Call: PT WANTED TO CHECK ON AUTH FOR MRI, APPOINTMENT IS TODAY AT 1:30PM

## 2023-05-01 NOTE — TELEPHONE ENCOUNTER
Caller: ALFRED EUCEDA    Relationship: PATIENT     Best call back number: [phone]    Requested Prescriptions:   Requested Prescriptions     Pending Prescriptions Disp Refills   • midodrine (PROAMATINE) 5 MG tablet 60 tablet 3     Sig: Take 1 tablet by mouth 2 (Two) Times a Day Before Meals. Avoid lying down for 1 to 2 hours after taking the medication   • rizatriptan (Maxalt) 10 MG tablet 8 tablet 3     Sig: Take 1 tablet at onset of headache.  May repeat once in 2 hours.  Do not take more than 2 tabs a day or 8 tabs a month        Pharmacy where request should be sent: [facility] - [place], [place] - [facility] - [phone] [facility] [phone] FX     Last office visit with prescribing clinician: Visit date not found   Last telemedicine visit with prescribing clinician: 5/18/2023   Next office visit with prescribing clinician: 5/18/2023     Additional details provided by patient: PATIENT SWITCHED PHARM AND NEEDS THESE FAXED TO NEW PHARM     Does the patient have less than a 3 day supply:  [x] Yes  [] No    Would you like a call back once the refill request has been completed: [] Yes [x] No    If the office needs to give you a call back, can they leave a voicemail: [] Yes [x] No    Leonid Bentley Rep   05/01/23 13:13 EDT

## 2023-05-02 RX ORDER — MIDODRINE HYDROCHLORIDE 5 MG/1
5 TABLET ORAL
Qty: 60 TABLET | Refills: 0 | Status: SHIPPED | OUTPATIENT
Start: 2023-05-02

## 2023-05-02 RX ORDER — RIZATRIPTAN BENZOATE 10 MG/1
TABLET ORAL
Qty: 8 TABLET | Refills: 0 | Status: SHIPPED | OUTPATIENT
Start: 2023-05-02

## 2023-05-02 NOTE — TELEPHONE ENCOUNTER
Rx Refill Note  Requested Prescriptions     Pending Prescriptions Disp Refills   • midodrine (PROAMATINE) 5 MG tablet 60 tablet 3     Sig: Take 1 tablet by mouth 2 (Two) Times a Day Before Meals. Avoid lying down for 1 to 2 hours after taking the medication   • rizatriptan (Maxalt) 10 MG tablet 8 tablet 3     Sig: Take 1 tablet at onset of headache.  May repeat once in 2 hours.  Do not take more than 2 tabs a day or 8 tabs a month      Last filled:01/09/23  Last office visit with prescribing clinician: Visit date not found      Next office visit with prescribing clinician: 5/18/2023   Sent in prescription to pharmacy with 0 refills, may request more at 05/18/23 appt      Diana Quinones MA  05/02/23, 10:35 EDT

## 2023-05-18 ENCOUNTER — OFFICE VISIT (OUTPATIENT)
Dept: NEUROLOGY | Facility: CLINIC | Age: 58
End: 2023-05-18
Payer: MEDICARE

## 2023-05-18 VITALS
HEIGHT: 64 IN | HEART RATE: 89 BPM | DIASTOLIC BLOOD PRESSURE: 86 MMHG | BODY MASS INDEX: 39.13 KG/M2 | SYSTOLIC BLOOD PRESSURE: 138 MMHG | WEIGHT: 229.2 LBS | OXYGEN SATURATION: 96 %

## 2023-05-18 DIAGNOSIS — G43.009 MIGRAINE WITHOUT AURA AND WITHOUT STATUS MIGRAINOSUS, NOT INTRACTABLE: Primary | ICD-10-CM

## 2023-05-18 DIAGNOSIS — R55 SYNCOPE AND COLLAPSE: ICD-10-CM

## 2023-05-18 PROCEDURE — 99214 OFFICE O/P EST MOD 30 MIN: CPT | Performed by: PSYCHIATRY & NEUROLOGY

## 2023-05-18 PROCEDURE — 3075F SYST BP GE 130 - 139MM HG: CPT | Performed by: PSYCHIATRY & NEUROLOGY

## 2023-05-18 PROCEDURE — 3079F DIAST BP 80-89 MM HG: CPT | Performed by: PSYCHIATRY & NEUROLOGY

## 2023-05-18 PROCEDURE — 1159F MED LIST DOCD IN RCRD: CPT | Performed by: PSYCHIATRY & NEUROLOGY

## 2023-05-18 PROCEDURE — 1160F RVW MEDS BY RX/DR IN RCRD: CPT | Performed by: PSYCHIATRY & NEUROLOGY

## 2023-05-18 RX ORDER — LANOLIN ALCOHOL/MO/W.PET/CERES
1000 CREAM (GRAM) TOPICAL DAILY
COMMUNITY

## 2023-05-18 RX ORDER — DIPHENHYDRAMINE HCL 25 MG
25 CAPSULE ORAL EVERY 6 HOURS PRN
COMMUNITY

## 2023-05-18 RX ORDER — MONTELUKAST SODIUM 10 MG/1
10 TABLET ORAL NIGHTLY
COMMUNITY

## 2023-05-18 RX ORDER — MIDODRINE HYDROCHLORIDE 5 MG/1
5 TABLET ORAL
Qty: 90 TABLET | Refills: 6 | Status: SHIPPED | OUTPATIENT
Start: 2023-05-18

## 2023-05-18 RX ORDER — AMITRIPTYLINE HYDROCHLORIDE 25 MG/1
25 TABLET, FILM COATED ORAL NIGHTLY
Qty: 90 TABLET | Refills: 2 | Status: SHIPPED | OUTPATIENT
Start: 2023-05-18

## 2023-05-18 RX ORDER — MECLIZINE HYDROCHLORIDE 25 MG/1
25 TABLET ORAL 3 TIMES DAILY PRN
COMMUNITY

## 2023-05-18 NOTE — PROGRESS NOTES
Subjective:    CC: Ciara Hector is seen today for syncope, dizziness and headaches.      Current visit- patient states that her episodes of presyncope/syncope have improved since starting midodrine 5 mg twice daily however she had 2 more episodes with the last one being about 2 weeks ago.  She also continues to take metoprolol.  With regards to her headaches that are only occurring about 2-3 times a month she continues to take amitriptyline 20 mg nightly.  Tried Maxalt which has been extremely helpful in addition to applying ice packs.  She had her MRI brain and carotid Doppler at the [facility] but we never received the results.  Her episodes of vertigo have improved.    Last visit-I am seeing the patient after more than 2 years.  She states that since 23 December she has been having bouts of vertigo mainly when turning her head accompanied by nausea.  Her PCP prescribed her meclizine that she has just started to take.  She has also been feeling weak all over since these episodes started denies snoring at night but does feel tired during the daytime.  She has also had more episodes of postural lightheadedness in the past few weeks and 2 episodes of syncope last week.  She continues to take metoprolol 25 mg twice a day.  With regards to her headaches she increased her dose of amitriptyline to 20 mg nightly but has continued to have about 3-4 severe headaches a month for which she takes Excedrin that may or may not be helpful.    Last visit-patient states that since starting metoprolol 25 mg twice a day she has not had any more episodes of syncope.  She continues to have some dizziness on standing up but tries to get up slowly.  Is trying to keep herself well-hydrated but has not started wearing compression stockings yet.  Her blood pressure is also better controlled and it mostly runs in the 120s/130s.  Since switching from Inderal to metoprolol her headaches were worse hence I had started her on  amitriptyline 10 mg nightly which did help with the headaches however she continues to get a few headaches each month.      Last visit-since the last visit patient had a tilt table test which was positive for vasovagal syncope as she had a syncopal episode 11 minutes into the test.  Her baseline blood pressure was 90/68.  However she was never started on any medications by cardiology.  Patient states she has had about 4 more episodes of passing out since September when she passes out briefly along with feeling hot and clammy.  She has started wearing compression stockings.  Her headaches are slightly better after she started Inderal LA 60 mg daily.  Her blood pressure is also better controlled as it is now running between 130-140/55-90 whereas before it was in the 160s/90s.    Last visit-since the last visit patient continues to have dizziness where she feels off balance and lightheadedness on standing up and walking.  She has also had 3 more syncopal episodes where she felt hot/clammy and passed out.  During 1 of these episodes she was laying down and during to others she was sitting down.  She was wearing her event monitor during the spells and it either showed sinus rhythm or sinus bradycardia/tachycardia.  There was also paroxysmal SVT but no atrial fibrillation.  Had a stress test that was low risk and an echocardiogram the results of which are still pending.  She also had a MRI brain that I personally reviewed today that showed minimal chronic ischemic changes but no acute intracranial abnormalities as well as no old infarcts.  CT angiogram of the head and neck showed minimal about 40% left and 25% right carotid stenosis.  She is currently on aspirin 81 mg.  Her last lipid panel in May was as follows-, , , HDL 59.  Patient smokes about 5 cigarettes a day.  Today patient is also complaining of near daily occipital headaches that go around like a band.  She denies having any photophobia or  "phonophobia with the headaches.  Also denies snoring much at night.  Her blood pressure at home is usually in the 160s/90s.    Initial cpokf-74-ro[age]-old female with a past medical history of hypertension, diabetes mellitus type 2 (well controlled off medications), arthritis presents with dizziness/syncopal episodes.  As per patient she started getting extremely dizzy in January this year.  She describes the dizziness as a feeling of the room spinning around her as well as a sense of constant imbalance while walking as if being \"drunk \".  The dizziness can be provoked by sudden head movement or by standing up but is present all the time now.  A few months ago she also started to have fatigue, weakness as well as frequent headaches that start off in the back of her head and radiate to the front like a band.  She states that her ears hurt too.  Had blood work that was unremarkable including a blood glucose level and imaging of the cervical spine which showed degenerative changes and was told by her PCP that her symptoms could be due to that however she saw Dr. Caballero who did not think that she was a surgical candidate right now or that her symptoms were being caused by a pinched nerve.  Patient states that she has also had 2 syncopal episodes in the past 3 weeks.  During the first episode she was eating in a restaurant with a friend when all of a sudden she felt hot even though there was air conditioning inside followed by passing out for a few minutes.  When she woke up she was extremely sweaty.  Her second episode happened a week ago when she got up to get something and suddenly fell down losing consciousness.  As per her son she was cold and clammy and her lips turned blue.  He also could not find a pulse on her for a few seconds.  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  I reviewed her last cardiac work-up in 2015 including an echocardiogram and a carotid Doppler.  Carotid Doppler showed bilateral plaques with no significant stenosis and echocardiogram showed a normal EF with mild pulmonary hypertension but no PFO.  Patient also says that she was started on lisinopril in May of this year as few of her readings in clinic for high however her baseline blood pressure has always been low.    The following portions of the patient's history were reviewed today and updated as of 07/23/2020  : allergies, current medications, past family history, past medical history, past social history, past surgical history and problem list  These document will be scanned to patient's chart.      Current Outpatient Medications:   •  aspirin 81 MG EC tablet, Take 1 tablet by mouth Daily., Disp: , Rfl:   •  atorvastatin (LIPITOR) 20 MG tablet, TAKE ONE TABLET BY MOUTH DAILY, Disp: 30 tablet, Rfl: 11  •  diphenhydrAMINE (BENADRYL) 25 mg capsule, Take 1 capsule by mouth Every 6 (Six) Hours As Needed for Itching., Disp: , Rfl:   •  esomeprazole (nexIUM) 40 MG capsule, Take 1 capsule by mouth Daily., Disp: , Rfl:   •  IBUPROFEN PO, Take  by mouth As Needed., Disp: , Rfl:   •  lisinopril (PRINIVIL,ZESTRIL) 10 MG tablet, Take 1 tablet by mouth Daily., Disp: , Rfl:   •  meclizine (ANTIVERT) 25 MG tablet, Take 1 tablet by mouth 3 (Three) Times a Day As Needed for Dizziness., Disp: , Rfl:   •  meloxicam (MOBIC) 7.5 MG tablet, Take 1 tablet by mouth Daily., Disp: , Rfl:   •  metFORMIN (GLUCOPHAGE) 500 MG tablet, Take 1 tablet by mouth 2 (Two) Times a Day With Meals., Disp: , Rfl:   •  metoprolol tartrate (LOPRESSOR) 25 MG tablet, TAKE ONE TABLET BY MOUTH TWICE A DAY, Disp: 60 tablet, Rfl: 2  •  midodrine (PROAMATINE) 5 MG tablet, Take 1 tablet by mouth 3 (Three) Times a Day Before Meals. Avoid lying down for 1 to 2 hours after taking the medication, Disp: 90 tablet, Rfl: 6  •   montelukast (SINGULAIR) 10 MG tablet, Take 1 tablet by mouth Every Night., Disp: , Rfl:   •  rizatriptan (Maxalt) 10 MG tablet, Take 1 tablet at onset of headache.  May repeat once in 2 hours.  Do not take more than 2 tabs a day or 8 tabs a month, Disp: 8 tablet, Rfl: 0  •  sertraline (ZOLOFT) 25 MG tablet, Take 2 tablets by mouth Daily., Disp: , Rfl:   •  vitamin B-12 (CYANOCOBALAMIN) 1000 MCG tablet, Take 1 tablet by mouth Daily., Disp: , Rfl:   •  amitriptyline (ELAVIL) 25 MG tablet, Take 1 tablet by mouth Every Night., Disp: 90 tablet, Rfl: 2   Past Medical History:   Diagnosis Date   • Bradycardia    • Cancer     uterus   • Diabetes mellitus     Type 2   • GERD (gastroesophageal reflux disease)    • Hypertension    • Sciatic pain    • Syncope and collapse       Past Surgical History:   Procedure Laterality Date   • ABDOMINAL HYSTERECTOMY     • APPENDECTOMY     • CHOLECYSTECTOMY     • DIAGNOSTIC LAPAROSCOPY      Lysis of Adhesions   • GASTRIC BYPASS     • LAPAROSCOPIC GASTRIC BANDING     • TUBAL ABDOMINAL LIGATION        Family History   Problem Relation Age of Onset   • Diabetes Mother    • Hypertension Mother    • Liver disease Mother    • No Known Problems Father       Social History     Socioeconomic History   • Marital status:    Tobacco Use   • Smoking status: Every Day     Packs/day: 0.50     Years: 20.00     Pack years: 10.00     Types: Cigarettes     Passive exposure: Current   • Smokeless tobacco: Never   Vaping Use   • Vaping Use: Never used   Substance and Sexual Activity   • Alcohol use: Yes     Comment: occ.   • Drug use: No   • Sexual activity: Defer     Review of Systems   Constitutional: Positive for fatigue.   Neurological: Positive for dizziness, syncope, light-headedness (feels like she is drunk at times, staggering etc.) and headache (Head hurts down the sides, including ear area).   All other systems reviewed and are negative.      Objective:    /86   Pulse 89   Ht 162.6 cm  "(64\")   Wt 104 kg (229 lb 3.2 oz)   SpO2 96%   BMI 39.34 kg/m²     Neurology Exam:    General apperance: NAD.  Orthostatics at last visit showed a 18 point drop point drop in systolic blood pressure on standing up.  Blood pressure on laying down went up to 180/78 with a pulse of 58 and blood pressure on standing up was 162/79 with a heart rate of 59.     Mental status: Alert, awake and oriented to time place and person.    Recent and Remote memory: Intact.    Attention span and Concentration: Normal.     Language and Speech: Intact- No dysarthria.    Fluency, Naming , Repitition and Comprehension:  Intact    Cranial Nerves:   CN II: Visual fields are full. Intact. Fundi - Normal, No papillederma, Pupils - YOEL  CN III, IV and VI: Extraocular movements are intact. Normal saccades.  Mild end gaze nystagmus to the right  CN V: Facial sensation is intact.   CN VII: Muscles of facial expression reveal no asymmetry. Intact.   CN VIII: Hearing is intact. Whispered voice intact.   CN IX and X: Palate elevates symmetrically. Intact  CN XI: Shoulder shrug is intact.   CN XII: Tongue is midline without evidence of atrophy or fasciculation.     Motor:  Right UE muscle strength 5/5. Normal tone.     Left UE muscle strength 5/5. Normal tone.      Right LE muscle strength5/5. Normal tone.     Left LE muscle strength 5/5. Normal tone.      Sensory: Normal light touch, vibration and pinprick sensation bilaterally.    DTRs: 2+ bilaterally in upper and lower extremities.    Babinski: Negative bilaterally.    Co-ordination: Normal finger-to-nose, heel to shin B/L.    Rhomberg: Positive    Gait: Normal    Cardiovascular: Regular rate and rhythm without murmur, gallop or rub.      Assessment and Plan:  1. Syncope and collapse  Most likely vasovagal in nature.  We had not received the results of her MRI brain yet  Tilt table test was positive for vasovagal syncope  I have asked her to continue metoprolol 25 mg twice a day  I have told " her to increase her midodrine to 5 mg 3 times daily  She should continue keeping herself well-hydrated and start wearing above-knee moderate compression stockings, eating small frequent meals in a day and carrying out  contraction exercises of her legs    2.  Migraine headache  I will increase her amitriptyline to 25 mg nightly  She can continue Maxalt 10 mg as needed    3.  Carotid artery stenosis  CT angiogram of the neck showed 25% right and 40% left ICA stenosis.    We have not received the results of her repeat carotid Doppler  She should continue aspirin 81 mg daily and Lipitor 20 mg daily for goal LDL of less than 100.  Her LDL was 102          Return in about 6 months (around 11/18/2023).     I spent 25 minutes with the patient out of which over 20 minutes was spent face to face.    Lis Israel MD

## 2023-05-25 ENCOUNTER — TELEPHONE (OUTPATIENT)
Dept: NEUROLOGY | Facility: CLINIC | Age: 58
End: 2023-05-25
Payer: MEDICARE

## 2023-05-26 ENCOUNTER — TELEPHONE (OUTPATIENT)
Dept: NEUROLOGY | Facility: CLINIC | Age: 58
End: 2023-05-26
Payer: MEDICARE

## 2023-05-26 RX ORDER — ATORVASTATIN CALCIUM 40 MG/1
40 TABLET, FILM COATED ORAL DAILY
Qty: 30 TABLET | Refills: 11 | Status: SHIPPED | OUTPATIENT
Start: 2023-05-26 | End: 2024-05-25

## 2023-05-26 NOTE — TELEPHONE ENCOUNTER
----- Message from Lis Israel MD sent at 5/26/2023  9:58 AM EDT -----  Can you please tell the patient that her left carotid stenosis has worsened to about 50 to 65%.  I want her to increase her aspirin to 325 mg daily and her atorvastatin from 20 to 40 mg daily.  I have sent in a new prescription of atorvastatin to her pharmacy.  Aspirin is over-the-counter

## 2023-09-01 RX ORDER — RIZATRIPTAN BENZOATE 10 MG/1
TABLET ORAL
Qty: 8 TABLET | Refills: 0 | Status: SHIPPED | OUTPATIENT
Start: 2023-09-01

## 2023-09-01 NOTE — TELEPHONE ENCOUNTER
Rx Refill Note  Requested Prescriptions     Pending Prescriptions Disp Refills    rizatriptan (MAXALT) 10 MG tablet [Pharmacy Med Name: RIZATRIPTAN  TAB 10MG] 8 tablet 0     Sig: TAKE 1 TABLET AT ONSET OF HEADACHE. MAY REPEAT ONCE IN 2 HOURS. DO NOT TAKE MORE THAN 2 TABLETS A DAY OR 8 TABLETS A MONTH      Last filled:05/02/2023  Last office visit with prescribing clinician: 5/18/2023      Next office visit with prescribing clinician: Visit date not found     Diana Quinones MA  09/01/23, 14:41 EDT    I sent the Rx to the pharmacy.

## 2025-07-18 ENCOUNTER — OFFICE VISIT (OUTPATIENT)
Dept: CARDIOLOGY | Facility: CLINIC | Age: 60
End: 2025-07-18
Payer: MEDICARE

## 2025-07-18 VITALS
HEIGHT: 64 IN | OXYGEN SATURATION: 98 % | SYSTOLIC BLOOD PRESSURE: 108 MMHG | WEIGHT: 172.2 LBS | DIASTOLIC BLOOD PRESSURE: 74 MMHG | BODY MASS INDEX: 29.4 KG/M2 | HEART RATE: 67 BPM

## 2025-07-18 DIAGNOSIS — R06.02 SOB (SHORTNESS OF BREATH): ICD-10-CM

## 2025-07-18 DIAGNOSIS — R00.2 PALPITATIONS: ICD-10-CM

## 2025-07-18 DIAGNOSIS — R00.1 BRADYCARDIA: ICD-10-CM

## 2025-07-18 DIAGNOSIS — R07.2 PRECORDIAL PAIN: ICD-10-CM

## 2025-07-18 DIAGNOSIS — R20.0 BILATERAL LEG NUMBNESS: ICD-10-CM

## 2025-07-18 DIAGNOSIS — R55 SYNCOPE AND COLLAPSE: ICD-10-CM

## 2025-07-18 DIAGNOSIS — R25.2 BILATERAL LEG CRAMPS: ICD-10-CM

## 2025-07-18 DIAGNOSIS — I73.9 CLAUDICATION OF BOTH LOWER EXTREMITIES: ICD-10-CM

## 2025-07-18 DIAGNOSIS — I10 ESSENTIAL HYPERTENSION: ICD-10-CM

## 2025-07-18 DIAGNOSIS — R23.8 CHANGE OF SKIN COLOR: ICD-10-CM

## 2025-07-18 DIAGNOSIS — Z72.0 TOBACCO ABUSE: ICD-10-CM

## 2025-07-18 DIAGNOSIS — R42 DIZZINESS OF UNKNOWN CAUSE: ICD-10-CM

## 2025-07-18 DIAGNOSIS — I73.9 PAD (PERIPHERAL ARTERY DISEASE): ICD-10-CM

## 2025-07-18 DIAGNOSIS — I65.23 BILATERAL CAROTID ARTERY STENOSIS: Primary | ICD-10-CM

## 2025-07-18 DIAGNOSIS — R23.1 COOL SKIN: ICD-10-CM

## 2025-07-18 DIAGNOSIS — I63.9 CEREBROVASCULAR ACCIDENT (CVA), UNSPECIFIED MECHANISM: ICD-10-CM

## 2025-07-18 PROBLEM — I47.10 PSVT (PAROXYSMAL SUPRAVENTRICULAR TACHYCARDIA): Status: ACTIVE | Noted: 2025-07-18

## 2025-07-18 PROCEDURE — 3074F SYST BP LT 130 MM HG: CPT | Performed by: INTERNAL MEDICINE

## 2025-07-18 PROCEDURE — 3078F DIAST BP <80 MM HG: CPT | Performed by: INTERNAL MEDICINE

## 2025-07-18 PROCEDURE — 99204 OFFICE O/P NEW MOD 45 MIN: CPT | Performed by: INTERNAL MEDICINE

## 2025-07-18 PROCEDURE — 93000 ELECTROCARDIOGRAM COMPLETE: CPT | Performed by: INTERNAL MEDICINE

## 2025-07-18 RX ORDER — SERTRALINE HCL 100 MG
100 TABLET ORAL DAILY
COMMUNITY

## 2025-07-18 RX ORDER — PREDNISONE 10 MG/1
10 TABLET ORAL
COMMUNITY

## 2025-07-18 RX ORDER — DICYCLOMINE HYDROCHLORIDE 10 MG/1
CAPSULE ORAL
COMMUNITY
Start: 2025-07-17

## 2025-07-18 RX ORDER — SEMAGLUTIDE 0.68 MG/ML
0.25 INJECTION, SOLUTION SUBCUTANEOUS WEEKLY
COMMUNITY

## 2025-07-18 RX ORDER — FOLIC ACID 1 MG/1
1000 TABLET ORAL DAILY
COMMUNITY

## 2025-07-18 RX ORDER — DICLOFENAC SODIUM 75 MG/1
75 TABLET, DELAYED RELEASE ORAL 2 TIMES DAILY PRN
COMMUNITY
Start: 2025-05-17

## 2025-07-18 RX ORDER — LISINOPRIL 20 MG/1
20 TABLET ORAL DAILY
COMMUNITY

## 2025-07-18 RX ORDER — ACETAMINOPHEN, ASPIRIN, AND CAFFEINE 250; 250; 65 MG/1; MG/1; MG/1
TABLET, FILM COATED ORAL
COMMUNITY

## 2025-07-18 RX ORDER — TIZANIDINE 2 MG/1
2 TABLET ORAL EVERY 8 HOURS PRN
COMMUNITY

## 2025-07-18 RX ORDER — CERTOLIZUMAB PEGOL 200 MG/ML
INJECTION, SOLUTION SUBCUTANEOUS
COMMUNITY

## 2025-07-18 RX ORDER — GABAPENTIN 300 MG/1
300 CAPSULE ORAL NIGHTLY
COMMUNITY

## 2025-07-18 RX ORDER — TRAMADOL HYDROCHLORIDE 50 MG/1
TABLET ORAL EVERY 8 HOURS PRN
COMMUNITY
Start: 2025-06-10

## 2025-07-18 RX ORDER — ATORVASTATIN CALCIUM 40 MG/1
40 TABLET, FILM COATED ORAL DAILY
COMMUNITY

## 2025-07-18 RX ORDER — LORATADINE 10 MG/1
10 TABLET ORAL DAILY
COMMUNITY

## 2025-07-18 RX ORDER — METFORMIN HYDROCHLORIDE 750 MG/1
750 TABLET, EXTENDED RELEASE ORAL 2 TIMES DAILY
COMMUNITY
Start: 2025-05-30

## 2025-07-18 RX ORDER — SENNOSIDES 8.6 MG
325 CAPSULE ORAL DAILY
COMMUNITY

## 2025-07-18 NOTE — PROGRESS NOTES
"Subjective   Ciara Nydia Hector is a 60 y.o. female     Chief Complaint   Patient presents with   • Establish Care     Here for eval. Needing closer cardiologist. Previously seen by Dr. Bhatti   • Syncope   • Chest Pain   • Dizziness   • Palpitations       PROBLEM LIST:     Dizziness of unknown disease  Bradycardia  Syncope and collapse  Echo 3-27-15: Normal LV, EF 60%.  Mild pulmonary hypertension, RVSP 35 mmHg.  Negative bubble study.  Event monitor, 07/29/2020: SR with PVC's and PSVT 7 beats, rate 149 bpm.   MPS, 09/03/2020: EF 75%. The patient reported mild chest tightness and shortness of breath which resolved in early recovery. No evidence of ischemia. Low risk study.   Echocardiogram, 09//03/2020: EF 60%. Trace MR and TR with normal RVSP. Left atrial cavity size is mildly dilated.  Tilt table test, 09/23/2020: Baseline rhythm sinus bradycardia 48 bpm, remained bradycardic throughout the test, reported 54 bpm at time of syncope. Baseline blood pressure 90/70 mmHg. Blood pressure could not be auscultated during the tilt exam. Recovery blood pressure charted as 92/68 mmHg. Patient experienced syncope 11 minutes into tilt table. The test was positive for vasovagal syncope. The patient has baseline bradycardia and borderline hypotension.  Essential hypertension  Bilateral carotid artery disease  5.1 Carotid u/s, 1-. 50-69% PLICA, no flowing limiting to DARRIN  Smoker  DM, type 2       8. History of TIA / pin strokes       9.DDD  10. GERD  11. RA    CT abd./ pelvis at [facility]. Hosp. On 4- with \"enlarged heart\" per scan per patient's phone    Denies Rheumatic / Scarlet fever    Specialty Problems          Cardiology Problems    Abnormal EKG        Bradycardia        Bilateral carotid artery disease        Essential hypertension             HPI:  Ms. Hector returns for follow-up.    She describes chest pain as a retrosternal discomfort (using Reed sign) sometimes accompanied by shortness of breath and " possibly precipitated by anxiety or emotional stress but not necessarily by physical activity.  She denies orthopnea or PND.    The patient also describes orthostatic lightheadedness and presyncope.  She also become presyncopal when she is standing for a prolonged.    Ms. Hector also has palpitations sometimes associated with lightheadedness and dizziness but not with sayda syncope.    She describes lower extremity claudication.                        PRIOR MEDICATIONS    Current Outpatient Medications on File Prior to Visit   Medication Sig Dispense Refill   • amitriptyline (ELAVIL) 25 MG tablet Take 1 tablet by mouth Every Night. 90 tablet 2   • aspirin 325 MG EC tablet Take 1 tablet by mouth Daily.     • aspirin-acetaminophen-caffeine (Excedrin Migraine) 250-250-65 MG per tablet prn     • atorvastatin (LIPITOR) 40 MG tablet Take 1 tablet by mouth Daily.     • Cholecalciferol 25 MCG (1000 UT) chewable tablet Chew 1 tablet Daily.     • Cimzia, 2 Syringe, 200 MG/ML Prefilled Syringe Kit injection USE 2 INJECTIONS SUBCUTANEOUSLY EVERY 28 DAYS AS DIRECTED.     • diclofenac (VOLTAREN) 75 MG EC tablet Take 1 tablet by mouth 2 (Two) Times a Day As Needed.     • dicyclomine (BENTYL) 10 MG capsule prn     • esomeprazole (nexIUM) 40 MG capsule Take 1 capsule by mouth Daily.     • folic acid (FOLVITE) 1 MG tablet Take 1 tablet by mouth Daily.     • gabapentin (NEURONTIN) 300 MG capsule Take 1 capsule by mouth Every Night.     • lisinopril (PRINIVIL,ZESTRIL) 20 MG tablet Take 1 tablet by mouth Daily.     • loratadine (CLARITIN) 10 MG tablet Take 1 tablet by mouth Daily.     • meclizine (ANTIVERT) 25 MG tablet Take 1 tablet by mouth 3 (Three) Times a Day As Needed for Dizziness.     • meloxicam (MOBIC) 7.5 MG tablet Take 1 tablet by mouth Daily.     • metFORMIN ER (GLUCOPHAGE-XR) 750 MG 24 hr tablet Take 1 tablet by mouth 2 (Two) Times a Day.     • metoprolol tartrate (LOPRESSOR) 25 MG tablet TAKE ONE TABLET BY MOUTH TWICE A  DAY 60 tablet 2   • midodrine (PROAMATINE) 5 MG tablet Take 1 tablet by mouth 3 (Three) Times a Day Before Meals. Avoid lying down for 1 to 2 hours after taking the medication 90 tablet 6   • montelukast (SINGULAIR) 10 MG tablet Take 1 tablet by mouth Every Night.     • predniSONE (DELTASONE) 10 MG tablet Take 1 tablet by mouth. Prn RA flare ups takes 2-5 days     • rizatriptan (MAXALT) 10 MG tablet TAKE 1 TABLET AT ONSET OF HEADACHE. MAY REPEAT ONCE IN 2 HOURS. DO NOT TAKE MORE THAN 2 TABLETS A DAY OR 8 TABLETS A MONTH 8 tablet 0   • Semaglutide,0.25 or 0.5MG/DOS, (Ozempic, 0.25 or 0.5 MG/DOSE,) 2 MG/3ML solution pen-injector Inject 0.25 mg under the skin into the appropriate area as directed 1 (One) Time Per Week.     • tiZANidine (ZANAFLEX) 2 MG tablet Take 1 tablet by mouth Every 8 (Eight) Hours As Needed.     • traMADol (ULTRAM) 50 MG tablet Every 8 (Eight) Hours As Needed.     • Zoloft 100 MG tablet Take 1 tablet by mouth Daily.     • [DISCONTINUED] aspirin 81 MG EC tablet Take 1 tablet by mouth Daily.     • [DISCONTINUED] diphenhydrAMINE (BENADRYL) 25 mg capsule Take 1 capsule by mouth Every 6 (Six) Hours As Needed for Itching.     • [DISCONTINUED] IBUPROFEN PO Take  by mouth As Needed.     • [DISCONTINUED] lisinopril (PRINIVIL,ZESTRIL) 10 MG tablet Take 1 tablet by mouth Daily.     • [DISCONTINUED] metFORMIN (GLUCOPHAGE) 500 MG tablet Take 1 tablet by mouth 2 (Two) Times a Day With Meals.     • [DISCONTINUED] sertraline (ZOLOFT) 25 MG tablet Take 2 tablets by mouth Daily.     • [DISCONTINUED] vitamin B-12 (CYANOCOBALAMIN) 1000 MCG tablet Take 1 tablet by mouth Daily.       No current facility-administered medications on file prior to visit.       ALLERGIES:    Dilaudid [hydromorphone hcl] and Tape    PAST MEDICAL HISTORY:    Past Medical History:   Diagnosis Date   • Bradycardia    • Cancer     uterus   • Diabetes mellitus     Type 2   • GERD (gastroesophageal reflux disease)    • Hyperlipidemia    •  "Hypertension    • Sciatic pain    • Stroke    • Syncope and collapse        SURGICAL HISTORY:    Past Surgical History:   Procedure Laterality Date   • ABDOMINAL HYSTERECTOMY     • APPENDECTOMY     • CHOLECYSTECTOMY     • COLONOSCOPY W/ POLYPECTOMY     • DIAGNOSTIC LAPAROSCOPY      Lysis of Adhesions   • GASTRIC BYPASS     • LAPAROSCOPIC GASTRIC BANDING     • TUBAL ABDOMINAL LIGATION         SOCIAL HISTORY:    Social History     Socioeconomic History   • Marital status:    Tobacco Use   • Smoking status: Every Day     Current packs/day: 0.50     Average packs/day: 0.5 packs/day for 20.0 years (10.0 ttl pk-yrs)     Types: Cigarettes     Passive exposure: Current   • Smokeless tobacco: Never   • Tobacco comments:     Smokes approx. 5 cigs. Daily for approx. 40 yrs.    Vaping Use   • Vaping status: Never Used   Substance and Sexual Activity   • Alcohol use: Yes     Comment: occ.   • Drug use: No   • Sexual activity: Defer       FAMILY HISTORY:    Family History   Problem Relation Age of Onset   • Diabetes Mother    • Hypertension Mother    • Liver disease Mother    • No Known Problems Father        Review of Systems   Constitutional:  Positive for fatigue. Negative for chills, diaphoresis, fever and unexpected weight change.   HENT:          Chronic sinus issues   Eyes:  Positive for visual disturbance (glasses).   Respiratory:  Positive for cough (from sinus drainage) and shortness of breath.    Cardiovascular:  Positive for chest pain (\"small\" pain to center of chest, may be caused with stress), palpitations and leg swelling (occas. ankles).   Gastrointestinal:  Positive for blood in stool (HX melena, GI specialist appt. in Oct.), constipation and diarrhea.   Endocrine: Negative for cold intolerance and heat intolerance.   Genitourinary: Negative.    Musculoskeletal:  Positive for arthralgias and myalgias.        Leg cramps with ambulation   Skin: Negative.    Allergic/Immunologic: Positive for environmental " "allergies.   Neurological:  Positive for dizziness, syncope, light-headedness and numbness (BLE's).        Can have with standing, with position changes. Resolves with lying after a few min's. Wears compression stockings, but can't in the heat. Can have with sitting but isn't as strong. Can have palps.    Hematological:  Bruises/bleeds easily.   Psychiatric/Behavioral:  Positive for sleep disturbance.        VISIT VITALS:  Vitals:    07/18/25 1035   BP: 108/74   BP Location: Left arm   Patient Position: Sitting   Pulse: 67   SpO2: 98%   Weight: 78.1 kg (172 lb 3.2 oz)   Height: 162.6 cm (64\")      /74 (BP Location: Left arm, Patient Position: Sitting)   Pulse 67   Ht 162.6 cm (64\")   Wt 78.1 kg (172 lb 3.2 oz)   SpO2 98%   BMI 29.56 kg/m²     RECENT LABS:    Objective       Physical Exam  Vitals and nursing note reviewed.   Constitutional:       General: She is not in acute distress.     Appearance: She is well-developed.   HENT:      Head: Normocephalic and atraumatic.   Eyes:      Conjunctiva/sclera: Conjunctivae normal.      Pupils: Pupils are equal, round, and reactive to light.   Neck:      Vascular: No carotid bruit, hepatojugular reflux or JVD.      Trachea: No tracheal deviation.      Comments: Nl. Carotid upstrokes  Cardiovascular:      Rate and Rhythm: Normal rate and regular rhythm.      Pulses:           Radial pulses are 2+ on the right side and 2+ on the left side.      Heart sounds: Normal heart sounds, S1 normal and S2 normal. No murmur heard.     No friction rub. No S3 or S4 sounds.   Pulmonary:      Effort: Pulmonary effort is normal.      Breath sounds: Rhonchi (scattered ,> rt. base than lt. cleared with cough) present. No wheezing or rales.      Comments: Nl. Expir. Phase  Nl. Breath sound intensity  Abdominal:      General: Bowel sounds are normal. There is no distension or abdominal bruit.      Palpations: Abdomen is soft. There is no mass.      Tenderness: There is no abdominal " tenderness. There is no guarding or rebound.      Comments: No organomegaly   Musculoskeletal:         General: No tenderness or deformity. Normal range of motion.      Cervical back: Normal range of motion and neck supple.      Right lower leg: No edema.      Left lower leg: No edema.      Comments: LLE, no edema, unable to palpitate pedal pulses, cap. Refill 5 sec.  RLE, no edema, unable to palpitate pedal pulses, cap. Refill 3 sec.   Skin:     General: Skin is warm and dry.      Coloration: Skin is not pale.      Findings: No erythema or rash.   Neurological:      Mental Status: She is alert and oriented to person, place, and time.   Psychiatric:         Behavior: Behavior normal.         Thought Content: Thought content normal.         Judgment: Judgment normal.         ECG 12 Lead    Date/Time: 7/18/2025 10:49 AM  Performed by: Rakesh Rashid MD    Authorized by: Rakesh Rashid MD  Comparison: compared with previous ECG from 7/29/2020  Comments: Sinus at 62.  Low voltage.  Horizontal axis.  QRS axis has normalized from prior otherwise no change.          Assessment & Plan   #1.  Chest discomfort.  The patient describes chest discomfort with some features compatible with ischemia.  As she is unable to ambulate we will perform pharmacologic stress testing for risk stratification and to direct therapy.  The patient is felt to be at moderate to high risk for coronary artery disease.    2.  Dizziness and presyncope.  The patient will wear a 30-day event monitor for further evaluation.    3.  Exertional dyspnea and decreased functional capacity.  We will obtain an echocardiogram to assess LV systolic and diastolic performance, LV filling pressures and pulmonary pressures.    4.  Peripheral arterial disease.  See #2 above.  We are perform carotid duplex for further evaluation.    5.  Symptoms could compatible with claudication with decreased pulses.  Will perform lower extremity duplex for further  evaluation.    6.  Palpitations.  See #2 above.    7.  Dyslipidemia.  We will check fasting lipid profile liver enzymes.    8.  The patient will follow with Dr. Phelan as instructed from our office and we will plan seeing her in follow-up after testing or on an as-needed basis as discussed.   Diagnosis Plan   1. Bilateral carotid artery stenosis        2. Bradycardia        3. Essential hypertension        4. Syncope and collapse        5. Dizziness of unknown cause        6. Tobacco abuse        7. Precordial pain        8. Palpitations            No follow-ups on file.         Ciara Hector  reports that she has been smoking cigarettes. She has a 10 pack-year smoking history. She has been exposed to tobacco smoke. She has never used smokeless tobacco. I have educated her on the risk of diseases from using tobacco products such as cancer, COPD, and heart disease.     I advised her to quit and she is not willing to quit.    I spent 3  minutes counseling the patient.            DO YOU VAPE? NO    BMI cannot be calculated due to outdated height or weight values.  Please input a current height/weight in Vitals and re-renter [id number] in Note to pull in correct documentation based on BMI range.               Electronically signed by:    Scribed for Rakesh Rashid MD by Alyce Elias LPN on July 18, 2025  at 10:50 EDT    I, Rakesh Rashid MD personally performed the services described in this documentation as scribed by the above named individual in my presence, and it is both accurate and complete. July 18, 2025 10:50 EDT      Dictated Utilizing Dragon Dictation: Part of this note may be an electronic transcription/translation of spoken language to printed text using the Dragon Dictation System.

## 2025-08-11 ENCOUNTER — HOSPITAL ENCOUNTER (OUTPATIENT)
Dept: CARDIOLOGY | Facility: HOSPITAL | Age: 60
Discharge: HOME | End: 2025-08-11
Payer: MEDICARE

## 2025-08-11 VITALS — WEIGHT: 172.18 LBS | HEIGHT: 64 IN | BODY MASS INDEX: 29.4 KG/M2

## 2025-08-11 DIAGNOSIS — R23.8 CHANGE OF SKIN COLOR: ICD-10-CM

## 2025-08-11 DIAGNOSIS — R25.2 BILATERAL LEG CRAMPS: ICD-10-CM

## 2025-08-11 DIAGNOSIS — I10 ESSENTIAL HYPERTENSION: ICD-10-CM

## 2025-08-11 DIAGNOSIS — R55 SYNCOPE AND COLLAPSE: ICD-10-CM

## 2025-08-11 DIAGNOSIS — R23.1 COOL SKIN: ICD-10-CM

## 2025-08-11 DIAGNOSIS — R07.2 PRECORDIAL PAIN: ICD-10-CM

## 2025-08-11 DIAGNOSIS — Z72.0 TOBACCO ABUSE: ICD-10-CM

## 2025-08-11 DIAGNOSIS — R00.2 PALPITATIONS: ICD-10-CM

## 2025-08-11 DIAGNOSIS — R06.02 SOB (SHORTNESS OF BREATH): ICD-10-CM

## 2025-08-11 DIAGNOSIS — I63.9 CEREBROVASCULAR ACCIDENT (CVA), UNSPECIFIED MECHANISM: ICD-10-CM

## 2025-08-11 DIAGNOSIS — R42 DIZZINESS OF UNKNOWN CAUSE: ICD-10-CM

## 2025-08-11 DIAGNOSIS — I65.23 BILATERAL CAROTID ARTERY STENOSIS: ICD-10-CM

## 2025-08-11 DIAGNOSIS — I73.9 CLAUDICATION OF BOTH LOWER EXTREMITIES: ICD-10-CM

## 2025-08-11 DIAGNOSIS — R20.0 BILATERAL LEG NUMBNESS: ICD-10-CM

## 2025-08-11 DIAGNOSIS — I73.9 PAD (PERIPHERAL ARTERY DISEASE): ICD-10-CM

## 2025-08-11 LAB
AORTIC DIMENSIONLESS INDEX: 0.87 (DI)
AV MEAN PRESS GRAD SYS DOP V1V2: 2.7 MMHG
AV VMAX SYS DOP: 124.6 CM/SEC
BH CV ECHO MEAS - 2D AUTO EF SIEMENS: 69 %
BH CV ECHO MEAS - ACS: 1.95 CM
BH CV ECHO MEAS - AO MAX PG: 6.2 MMHG
BH CV ECHO MEAS - AO ROOT DIAM: 3.1 CM
BH CV ECHO MEAS - AO V2 VTI: 25.9 CM
BH CV ECHO MEAS - EDV(CUBED): 63.6 ML
BH CV ECHO MEAS - ESV(CUBED): 18.2 ML
BH CV ECHO MEAS - FS: 34.1 %
BH CV ECHO MEAS - IVS/LVPW: 0.9 CM
BH CV ECHO MEAS - IVSD: 1 CM
BH CV ECHO MEAS - LA DIMENSION: 3.7 CM
BH CV ECHO MEAS - LAT PEAK E' VEL: 9.1 CM/SEC
BH CV ECHO MEAS - LV MASS(C)D: 136.1 GRAMS
BH CV ECHO MEAS - LV MAX PG: 4.3 MMHG
BH CV ECHO MEAS - LV MEAN PG: 1.43 MMHG
BH CV ECHO MEAS - LV V1 MAX: 104 CM/SEC
BH CV ECHO MEAS - LV V1 VTI: 22.6 CM
BH CV ECHO MEAS - LVIDD: 4 CM
BH CV ECHO MEAS - LVIDS: 2.6 CM
BH CV ECHO MEAS - LVPWD: 1.11 CM
BH CV ECHO MEAS - MED PEAK E' VEL: 7.4 CM/SEC
BH CV ECHO MEAS - MV A MAX VEL: 73.5 CM/SEC
BH CV ECHO MEAS - MV DEC SLOPE: 374.8 CM/SEC2
BH CV ECHO MEAS - MV DEC TIME: 0.31 SEC
BH CV ECHO MEAS - MV E MAX VEL: 60.1 CM/SEC
BH CV ECHO MEAS - MV E/A: 0.82
BH CV ECHO MEAS - MV MAX PG: 4.1 MMHG
BH CV ECHO MEAS - MV MEAN PG: 1.44 MMHG
BH CV ECHO MEAS - MV P1/2T: 65.7 MSEC
BH CV ECHO MEAS - MV V2 VTI: 35.1 CM
BH CV ECHO MEAS - MVA(P1/2T): 3.3 CM2
BH CV ECHO MEAS - PA V2 MAX: 90.8 CM/SEC
BH CV ECHO MEAS - RAP SYSTOLE: 8 MMHG
BH CV ECHO MEAS - RV MAX PG: 2.23 MMHG
BH CV ECHO MEAS - RV V1 MAX: 74.7 CM/SEC
BH CV ECHO MEAS - RV V1 VTI: 20.6 CM
BH CV ECHO MEAS - RVSP: 13.1 MMHG
BH CV ECHO MEAS - TAPSE (>1.6): 2.9 CM
BH CV ECHO MEAS - TR MAX PG: 5.1 MMHG
BH CV ECHO MEAS - TR MAX VEL: 113 CM/SEC
BH CV ECHO MEASUREMENTS AVERAGE E/E' RATIO: 7.28
BH CV LEA LEFT ANT TIBIAL A DISTAL PSV: 63 CM/S
BH CV LEA LEFT CFA PROX PSV: 106 CM/S
BH CV LEA LEFT DFA PROX PSV: 72 CM/S
BH CV LEA LEFT POPITEAL A  PROX PSV: 65 CM/S
BH CV LEA LEFT PTA DISTAL PSV: 78 CM/S
BH CV LEA LEFT SFA DISTAL PSV: -78.5 CM/S
BH CV LEA LEFT SFA MID PSV: -100 CM/S
BH CV LEA LEFT SFA PROX PSV: -100 CM/S
BH CV LEA RIGHT ANT TIBIAL A DISTAL PSV: 73 CM/S
BH CV LEA RIGHT CFA PROX PSV: 103 CM/S
BH CV LEA RIGHT DFA PROX PSV: 79 CM/S
BH CV LEA RIGHT POPITEAL A  PROX PSV: 71 CM/S
BH CV LEA RIGHT PTA DISTAL PSV: 70 CM/S
BH CV LEA RIGHT SFA DISTAL PSV: -93.2 CM/S
BH CV LEA RIGHT SFA MID PSV: -98.2 CM/S
BH CV LEA RIGHT SFA PROX PSV: -97.5 CM/S
BH CV REST NUCLEAR ISOTOPE DOSE: 10 MCI
BH CV STRESS COMMENTS STAGE 1: NORMAL
BH CV STRESS DOSE REGADENOSON STAGE 1: 0.4
BH CV STRESS DURATION MIN STAGE 1: 0
BH CV STRESS DURATION SEC STAGE 1: 10
BH CV STRESS NUCLEAR ISOTOPE DOSE: 30 MCI
BH CV STRESS PROTOCOL 1: NORMAL
BH CV STRESS RECOVERY BP: NORMAL MMHG
BH CV STRESS RECOVERY HR: 86 BPM
BH CV STRESS STAGE 1: 1
BH CV XLRA - TDI S': 12.7 CM/SEC
BH CV XLRA MEAS LEFT CAROTID BULB EDV: 25.5 CM/SEC
BH CV XLRA MEAS LEFT CAROTID BULB PSV: 82.6 CM/SEC
BH CV XLRA MEAS LEFT DIST CCA EDV: 21.1 CM/SEC
BH CV XLRA MEAS LEFT DIST CCA PSV: 69.6 CM/SEC
BH CV XLRA MEAS LEFT DIST ICA EDV: -27.3 CM/SEC
BH CV XLRA MEAS LEFT DIST ICA PSV: -88.3 CM/SEC
BH CV XLRA MEAS LEFT ICA/CCA RATIO: 2.6
BH CV XLRA MEAS LEFT MID ICA EDV: -53.8 CM/SEC
BH CV XLRA MEAS LEFT MID ICA PSV: -182 CM/SEC
BH CV XLRA MEAS LEFT PROX CCA EDV: 22.5 CM/SEC
BH CV XLRA MEAS LEFT PROX CCA PSV: 91 CM/SEC
BH CV XLRA MEAS LEFT PROX ECA PSV: -77.7 CM/SEC
BH CV XLRA MEAS LEFT PROX ICA EDV: 71.3 CM/SEC
BH CV XLRA MEAS LEFT PROX ICA PSV: 173 CM/SEC
BH CV XLRA MEAS LEFT VERTEBRAL A EDV: 11.8 CM/SEC
BH CV XLRA MEAS LEFT VERTEBRAL A PSV: 39.6 CM/SEC
BH CV XLRA MEAS RIGHT CAROTID BULB EDV: -23 CM/SEC
BH CV XLRA MEAS RIGHT CAROTID BULB PSV: -64 CM/SEC
BH CV XLRA MEAS RIGHT DIST CCA EDV: -24.2 CM/SEC
BH CV XLRA MEAS RIGHT DIST CCA PSV: -87 CM/SEC
BH CV XLRA MEAS RIGHT DIST ICA EDV: -36.4 CM/SEC
BH CV XLRA MEAS RIGHT DIST ICA PSV: -136 CM/SEC
BH CV XLRA MEAS RIGHT ICA/CCA RATIO: 1.6
BH CV XLRA MEAS RIGHT MID ICA EDV: -36.8 CM/SEC
BH CV XLRA MEAS RIGHT MID ICA PSV: -122 CM/SEC
BH CV XLRA MEAS RIGHT PROX CCA EDV: 17.4 CM/SEC
BH CV XLRA MEAS RIGHT PROX CCA PSV: 91.3 CM/SEC
BH CV XLRA MEAS RIGHT PROX ECA PSV: -85.7 CM/SEC
BH CV XLRA MEAS RIGHT PROX ICA EDV: -20.5 CM/SEC
BH CV XLRA MEAS RIGHT PROX ICA PSV: -95.1 CM/SEC
BH CV XLRA MEAS RIGHT VERTEBRAL A EDV: 28.6 CM/SEC
BH CV XLRA MEAS RIGHT VERTEBRAL A PSV: 71.1 CM/SEC
LEFT GROIN CFA SYS: 106 CM/SEC
LV EF 2D ECHO EST: 63 %
LV EF 3D SEGMENTATION: 59 %
MAXIMAL PREDICTED HEART RATE: 160 BPM
PERCENT MAX PREDICTED HR: 62.5 %
RIGHT GROIN CFA SYS: 103 CM/SEC
SINUS: 2.9 CM
STRESS BASELINE BP: NORMAL MMHG
STRESS BASELINE HR: 62 BPM
STRESS PERCENT HR: 74 %
STRESS POST PEAK BP: NORMAL MMHG
STRESS POST PEAK HR: 100 BPM
STRESS TARGET HR: 136 BPM

## 2025-08-11 PROCEDURE — 78452 HT MUSCLE IMAGE SPECT MULT: CPT

## 2025-08-11 PROCEDURE — A9500 TC99M SESTAMIBI: HCPCS | Performed by: INTERNAL MEDICINE

## 2025-08-11 PROCEDURE — 34310000005 TECHNETIUM SESTAMIBI: Performed by: INTERNAL MEDICINE

## 2025-08-11 PROCEDURE — 93925 LOWER EXTREMITY STUDY: CPT

## 2025-08-11 PROCEDURE — 25010000002 REGADENOSON 0.4 MG/5ML SOLUTION: Performed by: INTERNAL MEDICINE

## 2025-08-11 PROCEDURE — 93017 CV STRESS TEST TRACING ONLY: CPT

## 2025-08-11 PROCEDURE — 93306 TTE W/DOPPLER COMPLETE: CPT

## 2025-08-11 PROCEDURE — 93880 EXTRACRANIAL BILAT STUDY: CPT

## 2025-08-11 RX ORDER — REGADENOSON 0.08 MG/ML
0.4 INJECTION, SOLUTION INTRAVENOUS
Status: COMPLETED | OUTPATIENT
Start: 2025-08-11 | End: 2025-08-11

## 2025-08-11 RX ADMIN — TECHNETIUM TC 99M SESTAMIBI 1 DOSE: 1 INJECTION INTRAVENOUS at 11:04

## 2025-08-11 RX ADMIN — TECHNETIUM TC 99M SESTAMIBI 1 DOSE: 1 INJECTION INTRAVENOUS at 12:33

## 2025-08-11 RX ADMIN — REGADENOSON 0.4 MG: 0.08 INJECTION, SOLUTION INTRAVENOUS at 12:33

## 2025-08-20 LAB
BH CV REST NUCLEAR ISOTOPE DOSE: 10 MCI
BH CV STRESS COMMENTS STAGE 1: NORMAL
BH CV STRESS DOSE REGADENOSON STAGE 1: 0.4
BH CV STRESS DURATION MIN STAGE 1: 0
BH CV STRESS DURATION SEC STAGE 1: 10
BH CV STRESS NUCLEAR ISOTOPE DOSE: 30 MCI
BH CV STRESS PROTOCOL 1: NORMAL
BH CV STRESS RECOVERY BP: NORMAL MMHG
BH CV STRESS RECOVERY HR: 86 BPM
BH CV STRESS STAGE 1: 1
MAXIMAL PREDICTED HEART RATE: 160 BPM
PERCENT MAX PREDICTED HR: 62.5 %
STRESS BASELINE BP: NORMAL MMHG
STRESS BASELINE HR: 62 BPM
STRESS PERCENT HR: 74 %
STRESS POST PEAK BP: NORMAL MMHG
STRESS POST PEAK HR: 100 BPM
STRESS TARGET HR: 136 BPM

## 2025-08-23 LAB
AORTIC DIMENSIONLESS INDEX: 0.87 (DI)
AV MEAN PRESS GRAD SYS DOP V1V2: 2.7 MMHG
AV VMAX SYS DOP: 124.6 CM/SEC
BH CV ECHO MEAS - 2D AUTO EF SIEMENS: 69 %
BH CV ECHO MEAS - ACS: 1.95 CM
BH CV ECHO MEAS - AO MAX PG: 6.2 MMHG
BH CV ECHO MEAS - AO ROOT DIAM: 3.1 CM
BH CV ECHO MEAS - AO V2 VTI: 25.9 CM
BH CV ECHO MEAS - EDV(CUBED): 63.6 ML
BH CV ECHO MEAS - ESV(CUBED): 18.2 ML
BH CV ECHO MEAS - FS: 34.1 %
BH CV ECHO MEAS - IVS/LVPW: 0.9 CM
BH CV ECHO MEAS - IVSD: 1 CM
BH CV ECHO MEAS - LA DIMENSION: 3.7 CM
BH CV ECHO MEAS - LAT PEAK E' VEL: 9.1 CM/SEC
BH CV ECHO MEAS - LV MASS(C)D: 136.1 GRAMS
BH CV ECHO MEAS - LV MAX PG: 4.3 MMHG
BH CV ECHO MEAS - LV MEAN PG: 1.43 MMHG
BH CV ECHO MEAS - LV V1 MAX: 104 CM/SEC
BH CV ECHO MEAS - LV V1 VTI: 22.6 CM
BH CV ECHO MEAS - LVIDD: 4 CM
BH CV ECHO MEAS - LVIDS: 2.6 CM
BH CV ECHO MEAS - LVPWD: 1.11 CM
BH CV ECHO MEAS - MED PEAK E' VEL: 7.4 CM/SEC
BH CV ECHO MEAS - MV A MAX VEL: 73.5 CM/SEC
BH CV ECHO MEAS - MV DEC SLOPE: 374.8 CM/SEC2
BH CV ECHO MEAS - MV DEC TIME: 0.31 SEC
BH CV ECHO MEAS - MV E MAX VEL: 60.1 CM/SEC
BH CV ECHO MEAS - MV E/A: 0.82
BH CV ECHO MEAS - MV MAX PG: 4.1 MMHG
BH CV ECHO MEAS - MV MEAN PG: 1.44 MMHG
BH CV ECHO MEAS - MV P1/2T: 65.7 MSEC
BH CV ECHO MEAS - MV V2 VTI: 35.1 CM
BH CV ECHO MEAS - MVA(P1/2T): 3.3 CM2
BH CV ECHO MEAS - PA V2 MAX: 90.8 CM/SEC
BH CV ECHO MEAS - RAP SYSTOLE: 8 MMHG
BH CV ECHO MEAS - RV MAX PG: 2.23 MMHG
BH CV ECHO MEAS - RV V1 MAX: 74.7 CM/SEC
BH CV ECHO MEAS - RV V1 VTI: 20.6 CM
BH CV ECHO MEAS - RVSP: 13.1 MMHG
BH CV ECHO MEAS - TAPSE (>1.6): 2.9 CM
BH CV ECHO MEAS - TR MAX PG: 5.1 MMHG
BH CV ECHO MEAS - TR MAX VEL: 113 CM/SEC
BH CV ECHO MEASUREMENTS AVERAGE E/E' RATIO: 7.28
BH CV LEA LEFT ANT TIBIAL A DISTAL PSV: 63 CM/S
BH CV LEA LEFT CFA PROX PSV: 106 CM/S
BH CV LEA LEFT DFA PROX PSV: 72 CM/S
BH CV LEA LEFT POPITEAL A  PROX PSV: 65 CM/S
BH CV LEA LEFT PTA DISTAL PSV: 78 CM/S
BH CV LEA LEFT SFA DISTAL PSV: -78.5 CM/S
BH CV LEA LEFT SFA MID PSV: -100 CM/S
BH CV LEA LEFT SFA PROX PSV: -100 CM/S
BH CV LEA RIGHT ANT TIBIAL A DISTAL PSV: 73 CM/S
BH CV LEA RIGHT CFA PROX PSV: 103 CM/S
BH CV LEA RIGHT DFA PROX PSV: 79 CM/S
BH CV LEA RIGHT POPITEAL A  PROX PSV: 71 CM/S
BH CV LEA RIGHT PTA DISTAL PSV: 70 CM/S
BH CV LEA RIGHT SFA DISTAL PSV: -93.2 CM/S
BH CV LEA RIGHT SFA MID PSV: -98.2 CM/S
BH CV LEA RIGHT SFA PROX PSV: -97.5 CM/S
BH CV XLRA - TDI S': 12.7 CM/SEC
BH CV XLRA MEAS LEFT CAROTID BULB EDV: 25.5 CM/SEC
BH CV XLRA MEAS LEFT CAROTID BULB PSV: 82.6 CM/SEC
BH CV XLRA MEAS LEFT DIST CCA EDV: 21.1 CM/SEC
BH CV XLRA MEAS LEFT DIST CCA PSV: 69.6 CM/SEC
BH CV XLRA MEAS LEFT DIST ICA EDV: -27.3 CM/SEC
BH CV XLRA MEAS LEFT DIST ICA PSV: -88.3 CM/SEC
BH CV XLRA MEAS LEFT ICA/CCA RATIO: 2.6
BH CV XLRA MEAS LEFT MID ICA EDV: -53.8 CM/SEC
BH CV XLRA MEAS LEFT MID ICA PSV: -182 CM/SEC
BH CV XLRA MEAS LEFT PROX CCA EDV: 22.5 CM/SEC
BH CV XLRA MEAS LEFT PROX CCA PSV: 91 CM/SEC
BH CV XLRA MEAS LEFT PROX ECA PSV: -77.7 CM/SEC
BH CV XLRA MEAS LEFT PROX ICA EDV: 71.3 CM/SEC
BH CV XLRA MEAS LEFT PROX ICA PSV: 173 CM/SEC
BH CV XLRA MEAS LEFT VERTEBRAL A EDV: 11.8 CM/SEC
BH CV XLRA MEAS LEFT VERTEBRAL A PSV: 39.6 CM/SEC
BH CV XLRA MEAS RIGHT CAROTID BULB EDV: -23 CM/SEC
BH CV XLRA MEAS RIGHT CAROTID BULB PSV: -64 CM/SEC
BH CV XLRA MEAS RIGHT DIST CCA EDV: -24.2 CM/SEC
BH CV XLRA MEAS RIGHT DIST CCA PSV: -87 CM/SEC
BH CV XLRA MEAS RIGHT DIST ICA EDV: -36.4 CM/SEC
BH CV XLRA MEAS RIGHT DIST ICA PSV: -136 CM/SEC
BH CV XLRA MEAS RIGHT ICA/CCA RATIO: 1.6
BH CV XLRA MEAS RIGHT MID ICA EDV: -36.8 CM/SEC
BH CV XLRA MEAS RIGHT MID ICA PSV: -122 CM/SEC
BH CV XLRA MEAS RIGHT PROX CCA EDV: 17.4 CM/SEC
BH CV XLRA MEAS RIGHT PROX CCA PSV: 91.3 CM/SEC
BH CV XLRA MEAS RIGHT PROX ECA PSV: -85.7 CM/SEC
BH CV XLRA MEAS RIGHT PROX ICA EDV: -20.5 CM/SEC
BH CV XLRA MEAS RIGHT PROX ICA PSV: -95.1 CM/SEC
BH CV XLRA MEAS RIGHT VERTEBRAL A EDV: 28.6 CM/SEC
BH CV XLRA MEAS RIGHT VERTEBRAL A PSV: 71.1 CM/SEC
LEFT GROIN CFA SYS: 106 CM/SEC
LV EF 2D ECHO EST: 63 %
LV EF 3D SEGMENTATION: 59 %
RIGHT GROIN CFA SYS: 103 CM/SEC
SINUS: 2.9 CM

## 2025-08-26 ENCOUNTER — RESULTS FOLLOW-UP (OUTPATIENT)
Dept: CARDIOLOGY | Facility: CLINIC | Age: 60
End: 2025-08-26
Payer: MEDICARE

## 2025-08-26 DIAGNOSIS — I73.9 PAD (PERIPHERAL ARTERY DISEASE): ICD-10-CM

## 2025-08-26 DIAGNOSIS — I65.23 BILATERAL CAROTID ARTERY STENOSIS: Primary | ICD-10-CM

## 2025-08-26 DIAGNOSIS — R93.89 ABNORMAL CAROTID ULTRASOUND: ICD-10-CM
